# Patient Record
Sex: FEMALE | Race: OTHER | HISPANIC OR LATINO | Employment: FULL TIME | URBAN - METROPOLITAN AREA
[De-identification: names, ages, dates, MRNs, and addresses within clinical notes are randomized per-mention and may not be internally consistent; named-entity substitution may affect disease eponyms.]

---

## 2017-01-05 ENCOUNTER — TELEPHONE (OUTPATIENT)
Dept: MEDICAL GROUP | Facility: MEDICAL CENTER | Age: 32
End: 2017-01-05

## 2017-01-05 ENCOUNTER — OFFICE VISIT (OUTPATIENT)
Dept: MEDICAL GROUP | Facility: MEDICAL CENTER | Age: 32
End: 2017-01-05
Payer: COMMERCIAL

## 2017-01-05 VITALS
SYSTOLIC BLOOD PRESSURE: 106 MMHG | HEART RATE: 101 BPM | BODY MASS INDEX: 28.54 KG/M2 | OXYGEN SATURATION: 100 % | HEIGHT: 64 IN | TEMPERATURE: 99 F | DIASTOLIC BLOOD PRESSURE: 76 MMHG | WEIGHT: 167.2 LBS

## 2017-01-05 DIAGNOSIS — R73.03 PREDIABETES: ICD-10-CM

## 2017-01-05 DIAGNOSIS — E55.9 VITAMIN D DEFICIENCY: ICD-10-CM

## 2017-01-05 DIAGNOSIS — Z30.09 FAMILY PLANNING: ICD-10-CM

## 2017-01-05 DIAGNOSIS — E89.0 HYPOTHYROIDISM, POSTSURGICAL: ICD-10-CM

## 2017-01-05 PROCEDURE — 99214 OFFICE O/P EST MOD 30 MIN: CPT | Performed by: NURSE PRACTITIONER

## 2017-01-05 NOTE — ASSESSMENT & PLAN NOTE
Tolerating ocps well  Denies side effects  Is not interested in having more children for quite some time and is interested in Nexplanon.

## 2017-01-05 NOTE — ASSESSMENT & PLAN NOTE
12/29/16: A1C=5.8  She was taking metformin 500mg daily before she became pregnant. She was not on insulin during her pregnancy. She states she was was not diagnosed with gestation diabetes

## 2017-01-05 NOTE — MR AVS SNAPSHOT
"        Shreya White   2017 10:00 AM   Office Visit   MRN: [id number]    Department:  [address] [place] [address]   Dept Phone:  [phone]    Description:  Female : 1985   Provider:  GENOVEVA Guzman           Reason for Visit     Hypothyroidism review lab results      Allergies as of 2017     No Known Allergies      You were diagnosed with     Vitamin D deficiency   [5792531]   start Vit [id number] daily recheck in 3 months    Prediabetes   [901339]   based on 5.8 hemoglobin a1c will restart 500mg metformin daily. she declines diabetes ed at this time. labs in 3 months    Hypothyroidism, postsurgical   [255125]   keep appt with Dr. Mahoney this month to discuss dosing of levothyroxine    Family planning   [688049]   will discuss with collaboring MD and schedule after we get device.      Vital Signs     Blood Pressure Pulse Temperature Height Weight Body Mass Index    106/76 mmHg 101 37.2 °C (99 °F) 1.626 m (5' 4.02\") 75.841 kg (167 lb 3.2 oz) 28.69 kg/m2    Oxygen Saturation Last Menstrual Period Breastfeeding? Smoking Status          100% 2016 No Never Smoker         Basic Information     Date Of Birth Sex Race Ethnicity Preferred Language    1985 Female Other  Origin (Italian,Indonesian,American,Arnel, etc) English      Your appointments     2017  1:20 PM   Established Patient with Josse Mahoney M.D.   [facility]    [address], [address]  [facility] [address] [phone]           You will be receiving a confirmation call a few days before your appointment from our automated call confirmation system.              Problem List              ICD-10-CM Priority Class Noted - Resolved    Hypothyroidism, postsurgical E89.0   2015 - Present    Prediabetes R73.03   Unknown - Present    Papillary carcinoma of thyroid (HCC) C73   Unknown - Present    Family planning Z30.09   2016 - Present    Vitamin D " deficiency E55.9   1/5/2017 - Present      Health Maintenance        Date Due Completion Dates    PAP SMEAR 7/6/2008 ---    IMM DTaP/Tdap/Td Vaccine (2 - Td) 7/8/2025 7/8/2015            Current Immunizations     Influenza Vaccine Quad Inj (Pf) 11/23/2016    Tdap Vaccine 7/8/2015      Below and/or attached are the medications your provider expects you to take. Review all of your home medications and newly ordered medications with your provider and/or pharmacist. Follow medication instructions as directed by your provider and/or pharmacist. Please keep your medication list with you and share with your provider. Update the information when medications are discontinued, doses are changed, or new medications (including over-the-counter products) are added; and carry medication information at all times in the event of emergency situations     Allergies:  No Known Allergies          Medications  Valid as of: January 05, 2017 - 11:59 AM    Generic Name Brand Name Tablet Size Instructions for use    Levonorgestrel-Ethinyl Estrad (Tab) AVIANE, ALESSE, LESSINA 0.1-20 MG-MCG Take 1 Tab by mouth every day.        Levothyroxine Sodium (Tab) SYNTHROID 137 MCG Take 1 Tab by mouth Every morning on an empty stomach.        MetFORMIN HCl (Tab) GLUCOPHAGE 500 MG Take 1 Tab by mouth every day.        .                 Medicines prescribed today were sent to:     [facility] #[id number] - [place] [address]    Phone: [phone] Fax: [phone]    Open 24 Hours?: No      Medication refill instructions:       If your prescription bottle indicates you have medication refills left, it is not necessary to call your provider’s office. Please contact your pharmacy and they will refill your medication.    If your prescription bottle indicates you do not have any refills left, you may request refills at any time through one of the following ways: The online [employer] system (except Urgent Care), by calling  your provider’s office, or by asking your pharmacy to contact your provider’s office with a refill request. Medication refills are processed only during regular business hours and may not be available until the next business day. Your provider may request additional information or to have a follow-up visit with you prior to refilling your medication.   *Please Note: Medication refills are assigned a new Rx number when refilled electronically. Your pharmacy may indicate that no refills were authorized even though a new prescription for the same medication is available at the pharmacy. Please request the medicine by name with the pharmacy before contacting your provider for a refill.        Your To Do List     Future Labs/Procedures Complete By Expires    HEMOGLOBIN A1C  As directed 1/6/2018    VITAMIN D,25 HYDROXY  As directed 1/6/2018         [employer] Access Code: Activation code not generated  Current [employer] Status: Active

## 2017-01-05 NOTE — TELEPHONE ENCOUNTER
Phone Number Called: [phone] (home)       Message: patient was informed about needing to come to office to sign some consent forms for the Nexplanon    Left Message for patient to call back: no

## 2017-01-05 NOTE — ASSESSMENT & PLAN NOTE
Has upcoming appt this month with Dr. Mahoney  Takes levothyroxine 137mcg daily in am on empty stomach

## 2017-01-05 NOTE — PROGRESS NOTES
Subjective:     Chief Complaint   Patient presents with   • Hypothyroidism     review lab results     Shreya White is a 31 y.o. female here today for evaluation and management of:    Hypothyroidism, postsurgical  Has upcoming appt this month with Dr. Mahoney  Takes levothyroxine 137mcg daily in am on empty stomach      Family planning  Tolerating ocps well  Denies side effects  Is not interested in having more children for quite some time and is interested in Nexplanon.       Vitamin D deficiency  Recent labs show level at 26. Not supplementing    Prediabetes  12/29/16: A1C=5.8  She was taking metformin 500mg daily before she became pregnant. She was not on insulin during her pregnancy. She states she was was not diagnosed with gestation diabetes           Current medicines (including changes today)  Current Outpatient Prescriptions   Medication Sig Dispense Refill   • metformin (GLUCOPHAGE) 500 MG Tab Take 1 Tab by mouth every day. 30 Tab 4   • levonorgestrel-ethinyl estradiol (AVIANE, ALESSE, LESSINA) 0.1-20 MG-MCG per tablet Take 1 Tab by mouth every day. 28 Tab 3   • SYNTHROID 137 MCG Tab Take 1 Tab by mouth Every morning on an empty stomach. 30 Tab 3     No current facility-administered medications for this visit.     She  has a past medical history of Papillary carcinoma of thyroid (HCC) (2012); Postsurgical hypothyroidism (2012); and Prediabetes.    HM: pap record requested. All others health maintenance topics up-to-date.  ROS   Constitutional: Negative for fever, chills/sweats   Respiratory: Negative for shortness of breath, MACDONALD  Cardiovascular: Negative for chest pain or pressure  GI/: Negative for diarrhea/constipation / urinary difficulty  All other systems reviewed and are negative except as per HPI.       Hospital Outpatient Visit on 12/29/2016   Component Date Value   • Cholesterol,Tot 12/29/2016 159    • Triglycerides 12/29/2016 47    • HDL 12/29/2016 58    • LDL 12/29/2016 92    •  "Sodium 12/29/2016 137    • Potassium 12/29/2016 3.6    • Chloride 12/29/2016 102    • Co2 12/29/2016 26    • Anion Gap 12/29/2016 9.0    • Glucose 12/29/2016 99    • Bun 12/29/2016 11    • Creatinine 12/29/2016 0.58    • Calcium 12/29/2016 9.0    • AST(SGOT) 12/29/2016 14    • ALT(SGPT) 12/29/2016 13    • Alkaline Phosphatase 12/29/2016 50    • Total Bilirubin 12/29/2016 0.7    • Albumin 12/29/2016 4.4    • Total Protein 12/29/2016 7.7    • Globulin 12/29/2016 3.3    • A-G Ratio 12/29/2016 1.3    • Glycohemoglobin 12/29/2016 5.8*   • Est Avg Glucose 12/29/2016 120    • 25-Hydroxy   Vitamin D 25 12/29/2016 26*   • GFR If  12/29/2016 >60    • GFR If Non  [employer]mer[employer]* 12/29/2016 >60    Hospital Outpatient Visit on 12/29/2016   Component Date Value   • Free T-4 12/29/2016 1.75*   • TSH 12/29/2016 0.050*   • Thyroglobulin 12/29/2016 <0.1*   • Anti-Thyroglobulin 12/29/2016 <0.9    • Thyroglobulin by LC-MC/M* 12/29/2016 Not Applicable          Objective:     Blood pressure 106/76, pulse 101, temperature 37.2 °C (99 °F), height 1.626 m (5' 4.02\"), weight 75.841 kg (167 lb 3.2 oz), last menstrual period 12/29/2016, SpO2 100 %, not currently breastfeeding. Body mass index is 28.69 kg/(m^2).   Physical Exam:  Alert, oriented in no acute distress.  Eye contact is good, speech goal directed, affect calm  HEENT: conjunctiva non-injected, sclera non-icteric.  Pinna normal. TM pearly gray.   Oral mucous membranes pink and moist with no lesions.  Neck No adenopathy or masses in the neck or supraclavicular regions.  Lungs: clear to auscultation bilaterally with good excursion.  CV: regular rate and rhythm.  Ext: no edema, color normal, vascularity normal, temperature normal  Psych: Alert and oriented x3, normal affect and mood.  Skin: Warm, dry, good turgor, no rashes in visible areas.      Assessment and Plan:   The following treatment plan was discussed   1. Vitamin D deficiency  VITAMIN D,25 HYDROXY    start Vit " [id number] daily recheck in 3 months   2. Prediabetes  HEMOGLOBIN A1C    metformin (GLUCOPHAGE) 500 MG Tab    based on 5.8 hemoglobin a1c will restart 500mg metformin daily. she declines diabetes ed at this time. labs in 3 months   3. Hypothyroidism, postsurgical      keep appt with Dr. Mahoney this month to discuss dosing of levothyroxine   4. Family planning      will discuss with collaboring MD and schedule after we get device.     Patient understands and agrees to plan of care.        Followup: Return in about 3 months (around 4/5/2017). or sooner for new symptoms or should new problems arise      This dictation was created using voice recognition software. The accuracy of the dictation is limited to the abilities of the software. I expect there may be some errors of grammar and possibly content. The MA notes were reviewed and certain aspects of this information were incorporated into this note.

## 2017-01-11 ENCOUNTER — OFFICE VISIT (OUTPATIENT)
Dept: ENDOCRINOLOGY | Facility: MEDICAL CENTER | Age: 32
End: 2017-01-11
Payer: COMMERCIAL

## 2017-01-11 VITALS
BODY MASS INDEX: 28.17 KG/M2 | SYSTOLIC BLOOD PRESSURE: 110 MMHG | DIASTOLIC BLOOD PRESSURE: 72 MMHG | WEIGHT: 165 LBS | HEIGHT: 64 IN | OXYGEN SATURATION: 95 % | HEART RATE: 84 BPM

## 2017-01-11 DIAGNOSIS — C73 PAPILLARY CARCINOMA OF THYROID (HCC): ICD-10-CM

## 2017-01-11 DIAGNOSIS — E89.0 HYPOTHYROIDISM, POSTSURGICAL: Primary | ICD-10-CM

## 2017-01-11 PROCEDURE — 99214 OFFICE O/P EST MOD 30 MIN: CPT | Performed by: INTERNAL MEDICINE

## 2017-01-11 RX ORDER — LEVOTHYROXINE SODIUM 137 UG/1
137 TABLET ORAL
Qty: 90 TAB | Refills: 3 | Status: SHIPPED | OUTPATIENT
Start: 2017-01-11 | End: 2017-01-17 | Stop reason: SDUPTHER

## 2017-01-11 NOTE — MR AVS SNAPSHOT
"        Shreya White   2017 1:20 PM   Office Visit   MRN: [id number]    Department:  [facility]   Dept Phone:  [phone]    Description:  Female : 1985   Provider:  Josse Mahoney M.D.           Reason for Visit     Hypothyroidism thyroid carcinoma      Allergies as of 2017     No Known Allergies      You were diagnosed with     Hypothyroidism, postsurgical   [912580]  -  Primary     Papillary carcinoma of thyroid (HCC)   [531606]         Vital Signs     Blood Pressure Pulse Height Weight Body Mass Index Oxygen Saturation    110/72 mmHg 84 1.626 m (5' 4.02\") 74.844 kg (165 lb) 28.31 kg/m2 95%    Last Menstrual Period Smoking Status                2016 Never Smoker           Basic Information     Date Of Birth Sex Race Ethnicity Preferred Language    1985 Female Other  Origin (Bruneian,Nauruan,Ecuadorean,Arnel, etc) English      Your appointments     2017  8:00 AM   Established Patient with GENOVEVA Guzman   [facility] [address]    [address]  [address]  [facility] [address] [phone]           You will be receiving a confirmation call a few days before your appointment from our automated call confirmation system.            2017  9:00 AM   Established Patient with Josse Mahoney M.D.   [facility]    [address], [address]  [place] [address] [phone]           You will be receiving a confirmation call a few days before your appointment from our automated call confirmation system.              Problem List              ICD-10-CM Priority Class Noted - Resolved    Hypothyroidism, postsurgical E89.0   2015 - Present    Prediabetes R73.03   Unknown - Present    Papillary carcinoma of thyroid (HCC) C73   Unknown - Present    Family planning Z30.09   2016 - Present    Vitamin D deficiency E55.9   2017 - Present      Health Maintenance       "    Date Due Completion Dates    PAP SMEAR 7/6/2008 ---    IMM DTaP/Tdap/Td Vaccine (2 - Td) 7/8/2025 7/8/2015            Current Immunizations     Influenza Vaccine Quad Inj (Pf) 11/23/2016    Tdap Vaccine 7/8/2015      Below and/or attached are the medications your provider expects you to take. Review all of your home medications and newly ordered medications with your provider and/or pharmacist. Follow medication instructions as directed by your provider and/or pharmacist. Please keep your medication list with you and share with your provider. Update the information when medications are discontinued, doses are changed, or new medications (including over-the-counter products) are added; and carry medication information at all times in the event of emergency situations     Allergies:  No Known Allergies          Medications  Valid as of: January 11, 2017 -  1:36 PM    Generic Name Brand Name Tablet Size Instructions for use    Levonorgestrel-Ethinyl Estrad (Tab) AVIANE, ALESSE, LESSINA 0.1-20 MG-MCG Take 1 Tab by mouth every day.        Levothyroxine Sodium (Tab) SYNTHROID 137 MCG Take 1 Tab by mouth Every morning on an empty stomach.        Levothyroxine Sodium (Tab) SYNTHROID 137 MCG Take 1 Tab by mouth Every morning on an empty stomach.        MetFORMIN HCl (Tab) GLUCOPHAGE 500 MG Take 1 Tab by mouth every day.        .                 Medicines prescribed today were sent to:     [facility] #[id number] - [place] [address]    Phone: [phone] Fax: [phone]    Open 24 Hours?: No      Medication refill instructions:       If your prescription bottle indicates you have medication refills left, it is not necessary to call your provider’s office. Please contact your pharmacy and they will refill your medication.    If your prescription bottle indicates you do not have any refills left, you may request refills at any time through one of the following ways: The online [employer]  system (except Urgent Care), by calling your provider’s office, or by asking your pharmacy to contact your provider’s office with a refill request. Medication refills are processed only during regular business hours and may not be available until the next business day. Your provider may request additional information or to have a follow-up visit with you prior to refilling your medication.   *Please Note: Medication refills are assigned a new Rx number when refilled electronically. Your pharmacy may indicate that no refills were authorized even though a new prescription for the same medication is available at the pharmacy. Please request the medicine by name with the pharmacy before contacting your provider for a refill.        Your To Do List     Future Labs/Procedures Complete By Expires    ANTITHYROGLOBULIN AB  As directed 1/11/2018    FREE THYROXINE  As directed 7/14/2017    THYROGLOBULIN, QT  As directed 7/14/2017    TSH  As directed 7/14/2017         MyChart Access Code: Activation code not generated  Current [employer] Status: Active

## 2017-01-12 NOTE — PROGRESS NOTES
Chief Complaint   Patient presents with   • Hypothyroidism     thyroid carcinoma        HPI:        1. Post surgical hypothyroidism.    I am purposely over treating the patient with thyroid in order to suppress her TSH to a certain extent because of her previous metastatic papillary carcinoma relatively recent to 2012.  I would like to keep her TSH low for a while until we are reassured that there is going to be no recurrence.  She is tolerating this dose.  There is a little bit of equivocation about her sleep but otherwise there has been no cardiovascular symptoms.  No tachycardia or cardiac irregularity.  She is not particularly edgy or irritable.  No tremor and certainly no unwanted weight loss.  There may be some stimulation of appetite which can be a problem.  In any event, her free T4 is somewhat elevated at 1.7 (0.5-1.4) although some labs use 1.7 as their upper normal.  Her TSH is .05.  For the moment we are going to leave her dose unchanged.    2. Metastatic papillary carcinoma thyroid, 2012.    Her original resection was a 3.4 cm primary without vascular invasion or capsular invasion but she had four out of seven lymph nodes positive, the largest being 6 mm.  She did have postoperative I-131 157 millicuries.  However she has not had a follow up I-131 scan.  She has had three soft tissue ultrasounds that have not shown any suspicious adenopathy including the last one in August 2016.  Importantly her thyroglobulin level is less than measurable without interfering antibodies.  Also my examination of her neck is unremarkable and she is not aware of any abnormalities in the neck.      We will continue to follow in this fashion.  I don’t plan on doing a thyrogen stimulated I-131 scan unless there are indicators to the contrary.  I will see her again in six months. She is in agreement with the above.     ROS:  All other systems reported as negative or unchanged since last exam        Allergies: No Known  "Allergies    Current medicines including changes today:  Current Outpatient Prescriptions   Medication Sig Dispense Refill   • levothyroxine (SYNTHROID) 137 MCG Tab Take 1 Tab by mouth Every morning on an empty stomach. 90 Tab 3   • metformin (GLUCOPHAGE) 500 MG Tab Take 1 Tab by mouth every day. 30 Tab 4   • levonorgestrel-ethinyl estradiol (AVIANE, ALESSE, LESSINA) 0.1-20 MG-MCG per tablet Take 1 Tab by mouth every day. 28 Tab 3     No current facility-administered medications for this visit.        Past Medical History   Diagnosis Date   • Papillary carcinoma of thyroid (HCC) 2012     L 3.4 cm / 4/7 nodes + [id number]   • Postsurgical hypothyroidism 2012   • Prediabetes      A1c= 6.4       PHYSICAL EXAM:    /72 mmHg  Pulse 84  Ht 1.626 m (5' 4.02\")  Wt 74.844 kg (165 lb)  BMI 28.31 kg/m2  SpO2 95%  LMP 12/29/2016    Gen.   appears healthy, does not appear to be overtly thyrotoxic.    Skin   appropriate for sex and age    HEENT  unremarkable    Neck   No abnormalities in the thyroid area. No nodules or masses elsewhere in the neck or supraclavicular area.    Heart  regular    Extremities  no edema    Neuro  gait and station normal, no tremor    Psych  Appropriate, calm, pleasant    ASSESSMENT AND RECOMMENDATIONS    1. Hypothyroidism, postsurgical               TSH is purposely suppressed. Thyroid is tolerated well  - FREE THYROXINE; Future  - TSH; Future  - levothyroxine (SYNTHROID) 137 MCG Tab; Take 1 Tab by mouth Every morning on an empty stomach.     2. Papillary carcinoma of thyroid (HCC), 2012, metastatic, status post I-131 therapy           No evidence of persistent or residual disease    - THYROGLOBULIN, QT; Future  - ANTITHYROGLOBULIN AB; Future      DISPOSITION: Return in about 6 months (around 7/11/2017).       Josse Mahoney M.D.    Copies to: Nelsy Hicks, KISHORE.P.R.N. [phone]  "

## 2017-01-17 DIAGNOSIS — E89.0 HYPOTHYROIDISM, POSTSURGICAL: ICD-10-CM

## 2017-01-17 RX ORDER — LEVOTHYROXINE SODIUM 137 UG/1
137 TABLET ORAL
Qty: 90 TAB | Refills: 3 | Status: SHIPPED | OUTPATIENT
Start: 2017-01-17 | End: 2018-12-03 | Stop reason: SDUPTHER

## 2017-02-14 DIAGNOSIS — Z30.09 FAMILY PLANNING: ICD-10-CM

## 2017-02-14 RX ORDER — LEVONORGESTREL AND ETHINYL ESTRADIOL 0.1-0.02MG
1 KIT ORAL DAILY
Qty: 28 TAB | Refills: 3 | Status: SHIPPED | OUTPATIENT
Start: 2017-02-14 | End: 2018-01-18

## 2017-03-01 ENCOUNTER — OFFICE VISIT (OUTPATIENT)
Dept: MEDICAL GROUP | Facility: MEDICAL CENTER | Age: 32
End: 2017-03-01
Payer: COMMERCIAL

## 2017-03-01 DIAGNOSIS — Z30.017 INSERTION OF IMPLANTABLE SUBDERMAL CONTRACEPTIVE: ICD-10-CM

## 2017-03-01 DIAGNOSIS — Z30.09 FAMILY PLANNING: ICD-10-CM

## 2017-03-01 PROBLEM — Z97.5 NEXPLANON IN PLACE: Status: ACTIVE | Noted: 2017-03-01

## 2017-03-01 LAB
INT CON NEG: NEGATIVE
INT CON POS: POSITIVE
POC URINE PREGNANCY TEST: NORMAL

## 2017-03-01 PROCEDURE — 81025 URINE PREGNANCY TEST: CPT | Performed by: NURSE PRACTITIONER

## 2017-03-01 PROCEDURE — 11981 INSERTION DRUG DLVR IMPLANT: CPT | Performed by: NURSE PRACTITIONER

## 2017-03-01 NOTE — PROGRESS NOTES
PROCEDURE NOTE:      Nexplanon insertion    Indications for procedure:     Desire for contraception    Written and verbal consent were obtained after discussion of risks and benefits, including but not limited to bleeding, infection, expulsion, bruising.    Procedure:   The proposed procedure was explained to the patient. Risks, side effects, benefits, and alternatives were discussed. All questions were answered to the patient's satisfaction, who gave verbal informed consent. UPT negative.     The skin of the left medial arm was marked at the 8.5 cm proximal to medial epicondyle and an additional marker was made proximally. The area was cleaned with an betadine x 3. Local anesthesia was accomplished with 5 cc of 1% lidocaine with epinephrine.  The Nexplanon was inserted without resistance or complication. The edges and body of the Nexplanon were palpated by myself and the patient in the subdermal tissue.  Area was cleaned with alcohol swab, steri strip was applied.  A pressure dressing was applied.  Patient will keep pressure dressing in place for the next 24 hours. Patient tolerated the procedure well.     The patient was instructed to report any fever, redness, or bleeding. She should keep the area clean and band-aid over the insertion site.     RTC in 3 months to discuss vaginal bleeding, routine surveillance.  Pt advised to use back up contraception for one week.    Lot number: [id number]

## 2017-03-01 NOTE — PATIENT INSTRUCTIONS
Sterile Tape Wound Care  Some cuts and wounds can be closed using sterile tape, also called skin adhesive strips. Skin adhesive strips can be used for shallow (superficial) and simple cuts, wounds, lacerations, and surgical incisions. These strips act in place of stitches to hold the edges of the wound together, allowing for faster healing. Unlike stitches, the adhesive strips do not require needles or anesthetic medicine for placement. The strips will wear off naturally as the wound is healing. It is important to take proper care of your wound at home while it heals.   HOME CARE INSTRUCTIONS  · Try to keep the area around your wound clean and dry. Do not allow the adhesive strips to get wet for the first 12 hours.    · Do not use any soaps or ointments on the wound for the first 12 hours.    · If a bandage (dressing) has been applied, follow your health care provider's instructions for how often to change the dressing. Keep the dressing dry if one has been applied.    · Do not remove the adhesive strips. They will fall off on their own. If they do not, you may remove them gently after 10 days. You should gently wet the strips before removing them. For example, this can be done in the shower.  · Do not scratch, pick, or rub the wound area.    · Protect the wound from further injury until it is healed.    · Protect the wound from sun and tanning bed exposure while it is healing and for several weeks after healing.    · Only take over-the-counter or prescription medicines as directed by your health care provider.    · Keep all follow-up appointments as directed by your health care provider.    SEEK MEDICAL CARE IF:  Your adhesive strips become wet or soaked with blood before the wound has healed. The tape will need to be replaced.   SEEK IMMEDIATE MEDICAL CARE IF:  · You have increasing pain in the wound.    · You develop a rash after the strips are applied.  · Your wound becomes red, swollen, hot, or tender.    · You  have a red streak that goes away from the wound.    · You have pus coming from the wound.    · You have increased bleeding from the wound.  · You notice a bad smell coming from the wound.    · Your wound breaks open.  MAKE SURE YOU:  · Understand these instructions.  · Will watch your condition.  · Will get help right away if you are not doing well or get worse.     This information is not intended to replace advice given to you by your health care provider. Make sure you discuss any questions you have with your health care provider.     Document Released: 01/25/2006 Document Revised: 01/08/2016 Document Reviewed: 07/09/2014  [employer] Interactive Patient Education ©2016 [employer]

## 2017-03-01 NOTE — MR AVS SNAPSHOT
Shreya White   3/1/2017 11:00 AM   Office Visit   MRN: [id number]    Department:  [address] [place] [address]   Dept Phone:  [phone]    Description:  Female : 1985   Provider:  GENOVEVA Guzman           Allergies as of 3/1/2017     No Known Allergies      You were diagnosed with     Insertion of implantable subdermal contraceptive   [V25.5.ICD-9-CM]       Family planning   [471180]         Vital Signs     Smoking Status                   Never Smoker            Basic Information     Date Of Birth Sex Race Ethnicity Preferred Language    1985 Female Other  Origin (Kenyan,Burmese,Finnish,Grenadian, etc) English      Your appointments     2017  8:00 AM   Established Patient with GENOVEVA Guzman   [facility] [address]    [address]  [place] [address] [phone]           You will be receiving a confirmation call a few days before your appointment from our automated call confirmation system.            2017  9:00 AM   Established Patient with Josse Mahoney M.D.   [facility] ([facility])    [address]  [place] [address] [phone]           You will be receiving a confirmation call a few days before your appointment from our automated call confirmation system.              Problem List              ICD-10-CM Priority Class Noted - Resolved    Hypothyroidism, postsurgical E89.0   2015 - Present    Prediabetes R73.03   Unknown - Present    Papillary carcinoma of thyroid (CMS-HCC) C73   Unknown - Present    Family planning Z30.09   2016 - Present    Vitamin D deficiency E55.9   2017 - Present      Health Maintenance        Date Due Completion Dates    PAP SMEAR 2008 ---    IMM DTaP/Tdap/Td Vaccine (2 - Td) 2025            Results     POCT Pregnancy      Component Value Standard Range & Units    POC Urine Pregnancy Test neg Negative    Internal Control Positive Positive     Internal Control Negative Negative                         Current Immunizations     Influenza Vaccine Quad Inj (Pf) 11/23/2016    Tdap Vaccine 7/8/2015      Below and/or attached are the medications your provider expects you to take. Review all of your home medications and newly ordered medications with your provider and/or pharmacist. Follow medication instructions as directed by your provider and/or pharmacist. Please keep your medication list with you and share with your provider. Update the information when medications are discontinued, doses are changed, or new medications (including over-the-counter products) are added; and carry medication information at all times in the event of emergency situations     Allergies:  No Known Allergies          Medications  Valid as of: March 01, 2017 - 11:41 AM    Generic Name Brand Name Tablet Size Instructions for use    Levonorgestrel-Ethinyl Estrad (Tab) AVIANE, ALESSE, LESSINA 0.1-20 MG-MCG Take 1 Tab by mouth every day.        Levothyroxine Sodium (Tab) SYNTHROID 137 MCG Take 1 Tab by mouth Every morning on an empty stomach.        MetFORMIN HCl (Tab) GLUCOPHAGE 500 MG Take 1 Tab by mouth every day.        .                 Medicines prescribed today were sent to:     [facility][id number] - [place] [address]    Phone: [phone] Fax: [phone]    Open 24 Hours?: No    [facility] - [place], [place] - [address] [place] [address]    Phone: [phone] Fax: [phone]    Open 24 Hours?: No      Medication refill instructions:       If your prescription bottle indicates you have medication refills left, it is not necessary to call your provider’s office. Please contact your pharmacy and they will refill your medication.    If your prescription bottle indicates you do not have any refills left, you may request refills at any time  through one of the following ways: The online PDV system (except Urgent Care), by calling your provider’s office, or by asking your pharmacy to contact your provider’s office with a refill request. Medication refills are processed only during regular business hours and may not be available until the next business day. Your provider may request additional information or to have a follow-up visit with you prior to refilling your medication.   *Please Note: Medication refills are assigned a new Rx number when refilled electronically. Your pharmacy may indicate that no refills were authorized even though a new prescription for the same medication is available at the pharmacy. Please request the medicine by name with the pharmacy before contacting your provider for a refill.        Instructions    Sterile Tape Wound Care  Some cuts and wounds can be closed using sterile tape, also called skin adhesive strips. Skin adhesive strips can be used for shallow (superficial) and simple cuts, wounds, lacerations, and surgical incisions. These strips act in place of stitches to hold the edges of the wound together, allowing for faster healing. Unlike stitches, the adhesive strips do not require needles or anesthetic medicine for placement. The strips will wear off naturally as the wound is healing. It is important to take proper care of your wound at home while it heals.   HOME CARE INSTRUCTIONS  · Try to keep the area around your wound clean and dry. Do not allow the adhesive strips to get wet for the first 12 hours.    · Do not use any soaps or ointments on the wound for the first 12 hours.    · If a bandage (dressing) has been applied, follow your health care provider's instructions for how often to change the dressing. Keep the dressing dry if one has been applied.    · Do not remove the adhesive strips. They will fall off on their own. If they do not, you may remove them gently after 10 days. You should gently wet the  strips before removing them. For example, this can be done in the shower.  · Do not scratch, pick, or rub the wound area.    · Protect the wound from further injury until it is healed.    · Protect the wound from sun and tanning bed exposure while it is healing and for several weeks after healing.    · Only take over-the-counter or prescription medicines as directed by your health care provider.    · Keep all follow-up appointments as directed by your health care provider.    SEEK MEDICAL CARE IF:  Your adhesive strips become wet or soaked with blood before the wound has healed. The tape will need to be replaced.   SEEK IMMEDIATE MEDICAL CARE IF:  · You have increasing pain in the wound.    · You develop a rash after the strips are applied.  · Your wound becomes red, swollen, hot, or tender.    · You have a red streak that goes away from the wound.    · You have pus coming from the wound.    · You have increased bleeding from the wound.  · You notice a bad smell coming from the wound.    · Your wound breaks open.  MAKE SURE YOU:  · Understand these instructions.  · Will watch your condition.  · Will get help right away if you are not doing well or get worse.     This information is not intended to replace advice given to you by your health care provider. Make sure you discuss any questions you have with your health care provider.     Document Released: 01/25/2006 Document Revised: 01/08/2016 Document Reviewed: 07/09/2014  [employer] Patient Education ©2016 Elsevier Inc.            [employer] Access Code: Activation code not generated  Current [employer] Status: Active

## 2017-03-02 ENCOUNTER — SUPERVISING PHYSICIAN REVIEW (OUTPATIENT)
Dept: MEDICAL GROUP | Facility: MEDICAL CENTER | Age: 32
End: 2017-03-02

## 2017-03-02 NOTE — PROGRESS NOTES
I have reviewed and agree with history, assessment, and plan for office encounter on 3/1/2017 with REYES Guzman.    Face to face encounter/direct observation: No    Suggested changes to plan or follow-up: none

## 2017-04-05 ENCOUNTER — APPOINTMENT (OUTPATIENT)
Dept: MEDICAL GROUP | Facility: MEDICAL CENTER | Age: 32
End: 2017-04-05
Payer: COMMERCIAL

## 2017-05-02 DIAGNOSIS — R73.03 PREDIABETES: ICD-10-CM

## 2017-05-08 ENCOUNTER — TELEPHONE (OUTPATIENT)
Dept: MEDICAL GROUP | Facility: MEDICAL CENTER | Age: 32
End: 2017-05-08

## 2017-05-08 NOTE — TELEPHONE ENCOUNTER
1. Caller Name: Shreya                                         Call Back Number: [phone]       Patient approves a detailed voicemail message: N\A    2. SPECIFIC Action To Be Taken: Preventative Lab Work    3. Diagnosis/Clinical Reason for Request: Preventative Lab Order    4. Specialty & Provider Name/Lab/Imaging Location: [facility]    5. Is appointment scheduled for requested order/referral: no    Patient's insurance requests preventative labs to be done once a year, she was wondering if these could be ordered for her appt. It July. Patient's insurance did not tell her what kind of labs

## 2017-07-11 ENCOUNTER — OFFICE VISIT (OUTPATIENT)
Dept: MEDICAL GROUP | Facility: MEDICAL CENTER | Age: 32
End: 2017-07-11
Payer: COMMERCIAL

## 2017-07-11 ENCOUNTER — HOSPITAL ENCOUNTER (OUTPATIENT)
Dept: LAB | Facility: MEDICAL CENTER | Age: 32
End: 2017-07-11
Attending: INTERNAL MEDICINE
Payer: COMMERCIAL

## 2017-07-11 VITALS
SYSTOLIC BLOOD PRESSURE: 108 MMHG | RESPIRATION RATE: 16 BRPM | DIASTOLIC BLOOD PRESSURE: 64 MMHG | HEART RATE: 79 BPM | WEIGHT: 162 LBS | OXYGEN SATURATION: 98 % | TEMPERATURE: 98.7 F | HEIGHT: 64 IN | BODY MASS INDEX: 27.66 KG/M2

## 2017-07-11 DIAGNOSIS — C73 PAPILLARY CARCINOMA OF THYROID (HCC): ICD-10-CM

## 2017-07-11 DIAGNOSIS — E89.0 HYPOTHYROIDISM, POSTSURGICAL: ICD-10-CM

## 2017-07-11 DIAGNOSIS — R73.03 PREDIABETES: ICD-10-CM

## 2017-07-11 LAB
HBA1C MFR BLD: 5.6 % (ref ?–5.8)
INT CON NEG: NORMAL
INT CON POS: NORMAL
T4 FREE SERPL-MCNC: 1.16 NG/DL (ref 0.53–1.43)
TSH SERPL DL<=0.005 MIU/L-ACNC: 0.13 UIU/ML (ref 0.3–3.7)

## 2017-07-11 PROCEDURE — 83036 HEMOGLOBIN GLYCOSYLATED A1C: CPT | Performed by: NURSE PRACTITIONER

## 2017-07-11 PROCEDURE — 86800 THYROGLOBULIN ANTIBODY: CPT

## 2017-07-11 PROCEDURE — 99395 PREV VISIT EST AGE 18-39: CPT | Performed by: NURSE PRACTITIONER

## 2017-07-11 PROCEDURE — 84439 ASSAY OF FREE THYROXINE: CPT

## 2017-07-11 PROCEDURE — 36415 COLL VENOUS BLD VENIPUNCTURE: CPT

## 2017-07-11 PROCEDURE — 84443 ASSAY THYROID STIM HORMONE: CPT

## 2017-07-11 PROCEDURE — 84432 ASSAY OF THYROGLOBULIN: CPT

## 2017-07-11 NOTE — PROGRESS NOTES
Chief Complaint:     Shreya White is a 32 y.o. female who presents for annual exam and     Prediabetes  A1C today was 5.6  She has lost some weight  Current taking 500mg metformin daily      Pt has GYN provider: yes  Last colonoscopy: n/a  Bone density test:N\A  Gardiasil:no   Last Td: 7/2015  Regular exercise: yes     She  has a past medical history of Papillary carcinoma of thyroid (CMS-HCC) (2012); Postsurgical hypothyroidism (2012); and Prediabetes. She also has no past medical history of Encounter for long-term (current) use of other medications.  She  has past surgical history that includes thyroidectomy total.  Current Outpatient Prescriptions   Medication Sig Dispense Refill   • levothyroxine (SYNTHROID) 137 MCG Tab Take 1 Tab by mouth Every morning on an empty stomach. 90 Tab 3   • levonorgestrel-ethinyl estradiol (AVIANE, ALESSE, LESSINA) 0.1-20 MG-MCG per tablet Take 1 Tab by mouth every day. 28 Tab 3     No current facility-administered medications for this visit.     Social History   Substance Use Topics   • Smoking status: Never Smoker    • Smokeless tobacco: Never Used   • Alcohol Use: 0.0 oz/week     0 Standard drinks or equivalent per week      Comment: rare       Review Of Systems  Denies fever, chills, or sweats  Skin: negative for rash, scaling, itching, pigmentation, hair or nail changes.  Eyes: negative for visual blurring, double vision, eye pain, floaters and discharge from eyes  Ears/Nose/Throat: negative for tinnitus, vertigo, bleeding gums, dental problem and hoarseness, frequent URI's, sinus trouble, persistent sore throat  Respiratory: negative for persistent cough, hemoptysis, dyspnea, recurrent pneumonia or bronchitis, asthma and wheezing  Cardiovascular: negative for palpitations, tachycardia, irregular heart beat, chest pain, or peripheral edema.  Breast: Denies breast tenderness, mass, discharge, changes in size or contour, or abnormal cyclic  "discomfort.  Gastrointestinal: negative for poor appetite, dysphagia, nausea, heartburn or reflux, abdominal pain, hemorrhoids, constipation or diarrhea  Genitourinary: negative for nocturia, dysuria, frequency, hesitancy, incontinence, sexually transmitted disease, abnormal vaginal discharge, dysparunia or abnormal vaginal bleeding  Musculoskeletal: negative for joint swelling and muscle pain/ soreness  Neurologic: negative for migraine headaches, involuntary movements or tremor  Psychiatric: negative for excessive alcohol consumption or illegal drug usage, sleep disturbance, anxiety, depression, sexual difficulties  Hematologic/Lymphatic/Immunologic: negative for anemia, unusual bruising, swollen glands, HIV risk factors  Endocrine: negative for temperature intolerance, polydipsia, polyuria.       PHYSICAL EXAMINATION:  Blood pressure 108/64, pulse 79, temperature 37.1 °C (98.7 °F), resp. rate 16, height 1.626 m (5' 4\"), weight 73.483 kg (162 lb), SpO2 98 %, not currently breastfeeding.  Body mass index is 27.79 kg/(m^2).  Wt Readings from Last 4 Encounters:   07/11/17 73.483 kg (162 lb)   01/11/17 74.844 kg (165 lb)   01/05/17 75.841 kg (167 lb 3.2 oz)   11/23/16 78.109 kg (172 lb 3.2 oz)       Constitutional: Alert, no distress.  Skin: Warm, dry, good turgor, no rashes in visible areas.  Eye: Equal, round and reactive, conjunctiva clear, lids normal.  ENMT: Lips without lesions, good dentition, oropharynx clear.  Neck: Trachea midline, no masses, no thyromegaly.  Respiratory: Unlabored respiratory effort, lungs clear to auscultation, no wheezes, no ronchi.  Cardiovascular: Normal S1, S2, no murmur, no edema.  Abdomen: Soft, non-tender, no masses, no hepatosplenomegaly.  Psych: Alert and oriented x3, normal affect and mood.    ASSESSMENT/PLAN:  1. Prediabetes  HEMOGLOBIN A1C    POCT Hemoglobin A1C    trial off metformin. A1 in 3 months. heart healthy diet and physical activity.      HCM:  UTD   Labs per " orders  Immunizations per orders  Pt counseled about skin care, diet, supplements, and exercise.  Next office visit for recheck of chronic medical conditions is due in 3 months

## 2017-07-11 NOTE — MR AVS SNAPSHOT
"        Shreya White   2017 4:00 PM   Office Visit   MRN: [id number]    Department:  [address] [place] [address]   Dept Phone:  [phone]    Description:  Female : 1985   Provider:  GENOVEVA Guzman           Reason for Visit     Annual Exam Diabetes       Allergies as of 2017     No Known Allergies      You were diagnosed with     Prediabetes   [066166]         Vital Signs     Blood Pressure Pulse Temperature Respirations Height Weight    108/64 mmHg 79 37.1 °C (98.7 °F) 16 1.626 m (5' 4\") 73.483 kg (162 lb)    Body Mass Index Oxygen Saturation Smoking Status             27.79 kg/m2 98% Never Smoker          Basic Information     Date Of Birth Sex Race Ethnicity Preferred Language    1985 Female Other  Origin (Amharic,South Korean,Palauan,Arnel, etc) English      Your appointments     2017  4:40 PM   Established Patient with Josse Mahoney M.D.   [facility]    [address]  [facility] [address] [phone]           You will be receiving a confirmation call a few days before your appointment from our automated call confirmation system.            Oct 23, 2017  3:40 PM   NEW TO YOU with Shelbie Chapman M.D.   [facility] [address]    [address] [facility]  [address]  [facility] [address] [phone]              Problem List              ICD-10-CM Priority Class Noted - Resolved    Hypothyroidism, postsurgical E89.0   2015 - Present    Prediabetes R73.03   Unknown - Present    Papillary carcinoma of thyroid (CMS-HCC) C73   Unknown - Present    Vitamin D deficiency E55.9   2017 - Present    Nexplanon in place Z97.5   3/1/2017 - Present      Health Maintenance        Date Due Completion Dates    PAP SMEAR 2008 ---    IMM INFLUENZA (1) 2016    IMM DTaP/Tdap/Td Vaccine (2 - Td) 2025            Current Immunizations     Influenza Vaccine Quad Inj (Pf) " 11/23/2016    Tdap Vaccine 7/8/2015      Below and/or attached are the medications your provider expects you to take. Review all of your home medications and newly ordered medications with your provider and/or pharmacist. Follow medication instructions as directed by your provider and/or pharmacist. Please keep your medication list with you and share with your provider. Update the information when medications are discontinued, doses are changed, or new medications (including over-the-counter products) are added; and carry medication information at all times in the event of emergency situations     Allergies:  No Known Allergies          Medications  Valid as of: July 11, 2017 -  4:02 PM    Generic Name Brand Name Tablet Size Instructions for use    Levonorgestrel-Ethinyl Estrad (Tab) AVIANE, ALESSE, LESSINA 0.1-20 MG-MCG Take 1 Tab by mouth every day.        Levothyroxine Sodium (Tab) SYNTHROID 137 MCG Take 1 Tab by mouth Every morning on an empty stomach.        .                 Medicines prescribed today were sent to:     [facility] #[id number] - [place] - [address]    [address] [place] [address]    Phone: [phone] Fax: [phone]    Open 24 Hours?: No    [facility] - [address] [address]    Phone: [phone] Fax: [phone]    Open 24 Hours?: No      Medication refill instructions:       If your prescription bottle indicates you have medication refills left, it is not necessary to call your provider’s office. Please contact your pharmacy and they will refill your medication.    If your prescription bottle indicates you do not have any refills left, you may request refills at any time through one of the following ways: The online M/A-COM system (except Urgent Care), by calling your provider’s office, or by asking your pharmacy to contact your provider’s office with a refill request. Medication refills are processed  only during regular business hours and may not be available until the next business day. Your provider may request additional information or to have a follow-up visit with you prior to refilling your medication.   *Please Note: Medication refills are assigned a new Rx number when refilled electronically. Your pharmacy may indicate that no refills were authorized even though a new prescription for the same medication is available at the pharmacy. Please request the medicine by name with the pharmacy before contacting your provider for a refill.        Your To Do List     Future Labs/Procedures Complete By Expires    HEMOGLOBIN A1C  As directed 7/11/2018         [employer] Access Code: Activation code not generated  Current [employer] Status: Active

## 2017-07-11 NOTE — Clinical Note
[employer]  GENOVEVA Guzman  [address]  [place] [address]  Fax: [phone]   Authorization for Release/Disclosure of   Protected Health Information   Name: SHREYA LORA : 1985 SSN: [id number]   [place] [facility] [address] Phone:    [phone] (home)    I authorize the entity listed below to release/disclose the PHI below to:   [employer]/GENOVEVA Guzman and GENOVEVA Guzman   Provider or Entity Name:  Dr. Benoit   Address   City, [facility]   Phone:      Fax:     Reason for request: continuity of care   Information to be released:    [  ] LAST COLONOSCOPY,  including any PATH REPORT and follow-up  [  ] LAST FIT/COLOGUARD RESULT [  ] LAST DEXA  [  ] LAST MAMMOGRAM  [  ] LAST PAP  [  ] LAST LABS [  ] RETINA EXAM REPORT  [  ] IMMUNIZATION RECORDS  [  ] Release all info      [  ] Check here and initial the line next to each item to release ALL health information INCLUDING  _____ Care and treatment for drug and / or alcohol abuse  _____ HIV testing, infection status, or AIDS  _____ Genetic Testing    DATES OF SERVICE OR TIME PERIOD TO BE DISCLOSED: _____________  I understand and acknowledge that:  * This Authorization may be revoked at any time by you in writing, except if your health information has already been used or disclosed.  * Your health information that will be used or disclosed as a result of you signing this authorization could be re-disclosed by the recipient. If this occurs, your re-disclosed health information may no longer be protected by State or Federal laws.  * You may refuse to sign this Authorization. Your refusal will not affect your ability to obtain treatment.  * This Authorization becomes effective upon signing and will  on (date) __________.      If no date is indicated, this Authorization will  one (1) year from the signature date.    Name: Shreya Lora    Signature:   Date:     2017       PLEASE FAX REQUESTED RECORDS BACK TO: [phone]

## 2017-07-12 DIAGNOSIS — C73 PAPILLARY CARCINOMA OF THYROID (HCC): Primary | ICD-10-CM

## 2017-07-13 LAB
THYROGLOB AB SERPL-ACNC: <0.9 IU/ML (ref 0–4)
THYROGLOB SERPL-MCNC: <0.1 NG/ML (ref 1.3–31.8)
THYROGLOB SERPL-MCNC: ABNORMAL NG/ML (ref 1.3–31.8)

## 2017-07-18 ENCOUNTER — OFFICE VISIT (OUTPATIENT)
Dept: ENDOCRINOLOGY | Facility: MEDICAL CENTER | Age: 32
End: 2017-07-18
Payer: COMMERCIAL

## 2017-07-18 VITALS
HEIGHT: 64 IN | DIASTOLIC BLOOD PRESSURE: 84 MMHG | SYSTOLIC BLOOD PRESSURE: 112 MMHG | BODY MASS INDEX: 27.79 KG/M2 | HEART RATE: 81 BPM | OXYGEN SATURATION: 100 % | WEIGHT: 162.8 LBS

## 2017-07-18 DIAGNOSIS — E89.0 HYPOTHYROIDISM, POSTSURGICAL: Primary | ICD-10-CM

## 2017-07-18 DIAGNOSIS — C73 PAPILLARY CARCINOMA OF THYROID (HCC): ICD-10-CM

## 2017-07-18 PROCEDURE — 99213 OFFICE O/P EST LOW 20 MIN: CPT | Performed by: INTERNAL MEDICINE

## 2017-07-18 NOTE — MR AVS SNAPSHOT
"        Shreya White   2017 4:40 PM   Office Visit   MRN: [id number]    Department:  Endocrinology [facility]   Dept Phone:  [phone]    Description:  Female : 1985   Provider:  Josse Mahoney M.D.           Reason for Visit     Thyroid Carcinoma     Hypothyroidism           Allergies as of 2017     No Known Allergies      You were diagnosed with     Hypothyroidism, postsurgical   [249467]  -  Primary     Papillary carcinoma of thyroid (CMS-HCC)   [150186]         Vital Signs     Blood Pressure Pulse Height Weight Body Mass Index Oxygen Saturation    112/84 mmHg 81 1.626 m (5' 4\") 73.846 kg (162 lb 12.8 oz) 27.93 kg/m2 100%    Smoking Status                   Never Smoker            Basic Information     Date Of Birth Sex Race Ethnicity Preferred Language    1985 Female Other  Origin ([place]er,[place]ian,[place] Islander,H[place]ian, etc) English      Your appointments     2017  3:30 PM   US [id number] with S SHARMILAAN US 2   [facility]    [facility]  [address]  [address]  [place] [id number] [phone]            Oct 23, 2017  3:40 PM   NEW TO YOU with Shelbie Chapman M.D.   [facility] [address]    [address]  [address]  [place] [id number] [phone]            2018  4:20 PM   Established Patient with Josse Mahoney M.D.   [facility] ([facility]    [address]  [place] [id number] [phone]           You will be receiving a confirmation call a few days before your appointment from our automated call confirmation system.              Problem List              ICD-10-CM Priority Class Noted - Resolved    Hypothyroidism, postsurgical E89.0   2015 - Present    Prediabetes R73.03   Unknown - Present    Papillary carcinoma of thyroid (CMS-HCC) C73   Unknown - Present    Vitamin D deficiency E55.9   2017 - Present    Nexplanon in " place Z97.5   3/1/2017 - Present      Health Maintenance        Date Due Completion Dates    PAP SMEAR 7/6/2008 ---    IMM INFLUENZA (1) 9/1/2017 11/23/2016    IMM DTaP/Tdap/Td Vaccine (2 - Td) 7/8/2025 7/8/2015            Current Immunizations     Influenza Vaccine Quad Inj (Pf) 11/23/2016    Tdap Vaccine 7/8/2015      Below and/or attached are the medications your provider expects you to take. Review all of your home medications and newly ordered medications with your provider and/or pharmacist. Follow medication instructions as directed by your provider and/or pharmacist. Please keep your medication list with you and share with your provider. Update the information when medications are discontinued, doses are changed, or new medications (including over-the-counter products) are added; and carry medication information at all times in the event of emergency situations     Allergies:  No Known Allergies          Medications  Valid as of: July 18, 2017 -  5:17 PM    Generic Name Brand Name Tablet Size Instructions for use    Levonorgestrel-Ethinyl Estrad (Tab) LILA MIXONE LESSINA 0.1-20 MG-MCG Take 1 Tab by mouth every day.        Levothyroxine Sodium (Tab) SYNTHROID 137 MCG Take 1 Tab by mouth Every morning on an empty stomach.        .                 Medicines prescribed today were sent to:     [facility][id number] - [place] - [address]    [address] [place] [address]    Phone: [phone] Fax: [phone]    Open 24 Hours?: No    [facility] - [address] [address]    Phone: [phone] Fax: [phone]    Open 24 Hours?: No      Medication refill instructions:       If your prescription bottle indicates you have medication refills left, it is not necessary to call your provider’s office. Please contact your pharmacy and they will refill your medication.    If your prescription bottle indicates you do not have any refills  left, you may request refills at any time through one of the following ways: The online TxVia system (except Urgent Care), by calling your provider’s office, or by asking your pharmacy to contact your provider’s office with a refill request. Medication refills are processed only during regular business hours and may not be available until the next business day. Your provider may request additional information or to have a follow-up visit with you prior to refilling your medication.   *Please Note: Medication refills are assigned a new Rx number when refilled electronically. Your pharmacy may indicate that no refills were authorized even though a new prescription for the same medication is available at the pharmacy. Please request the medicine by name with the pharmacy before contacting your provider for a refill.        Your To Do List     Future Labs/Procedures Complete By Expires    ANTITHYROGLOBULIN AB  As directed 7/18/2018    FREE THYROXINE  As directed 1/18/2018    THYROGLOBULIN, QT  As directed 1/18/2018    TSH  As directed 1/18/2018         [employer] Access Code: Activation code not generated  Current TxVia Status: Active

## 2017-07-19 ENCOUNTER — HOSPITAL ENCOUNTER (OUTPATIENT)
Dept: RADIOLOGY | Facility: MEDICAL CENTER | Age: 32
End: 2017-07-19
Attending: INTERNAL MEDICINE
Payer: COMMERCIAL

## 2017-07-19 DIAGNOSIS — C73 PAPILLARY CARCINOMA OF THYROID (HCC): ICD-10-CM

## 2017-07-19 PROCEDURE — 76536 US EXAM OF HEAD AND NECK: CPT

## 2017-07-19 NOTE — PROGRESS NOTES
"Chief Complaint   Patient presents with   • Thyroid Carcinoma   • Hypothyroidism        HPI:    1.  Metastatic papillary carcinoma thyroid, 2012              No new developments in the neck or thyroid area. Not aware of any new nodules or masses. No voice change, difficulty swallowing, or breathing              Current thyroglobulin is unmeasurable without interfering antibodies.              Soft tissue neck ultrasound is pending tomorrow. Review results by my chart    2.  Postsurgical hypothyroidism                Patient is euthyroid. TSH is purposely suppressed at 0.13 with free thyroxine mid range at 1.1.                She is taking L-thyroxine 137 µg and tolerating it well. Previously she thought it disturbed her sleep but no longer having that difficulty.    ROS:  All other systems reported as negative or unchanged since last exam      Allergies: No Known Allergies    Current medicines including changes today:  Current Outpatient Prescriptions   Medication Sig Dispense Refill   • levothyroxine (SYNTHROID) 137 MCG Tab Take 1 Tab by mouth Every morning on an empty stomach. 90 Tab 3   • levonorgestrel-ethinyl estradiol (AVIANE, ALESSE, LESSINA) 0.1-20 MG-MCG per tablet Take 1 Tab by mouth every day. 28 Tab 3     No current facility-administered medications for this visit.        Past Medical History   Diagnosis Date   • Papillary carcinoma of thyroid (CMS-HCC) 2012     L 3.4 cm / 4/7 nodes + [id number]   • Postsurgical hypothyroidism 2012   • Prediabetes      A1c= 6.4       PHYSICAL EXAM:    /84 mmHg  Pulse 81  Ht 1.626 m (5' 4\")  Wt 73.846 kg (162 lb 12.8 oz)  BMI 27.93 kg/m2  SpO2 100%    Gen.   appears healthy     Skin   appropriate for sex and age    HEENT  unremarkable    Neck   No abnormalities in the thyroid area. No nodules or masses elsewhere in the neck or supraclavicular area.    Heart  regular    Extremities  no edema    Neuro  gait and station normal, no tremor    Psych  appropriate, calm, " pleasant      ASSESSMENT AND RECOMMENDATIONS    1. Papillary carcinoma of thyroid (CMS-HCC), 2012, metastatic             Post I-131 therapy without evidence of recurrence.             Current ultrasound pending tomorrow. Follow-up results by my chart  - ANTITHYROGLOBULIN AB; Future  - THYROGLOBULIN, QT; Future    2. Hypothyroidism, postsurgical          Clinically euthyroid with purposely suppressed TSH taking levothyroxine 137 µg daily  - FREE THYROXINE; Future  - TSH; Future      DISPOSITION: Return in about 6 months (around 1/18/2018).       Josse Mahoney M.D.    Copies to: GENOVEVA Guzman [phone]

## 2018-01-16 ENCOUNTER — APPOINTMENT (OUTPATIENT)
Dept: ENDOCRINOLOGY | Facility: MEDICAL CENTER | Age: 33
End: 2018-01-16
Payer: COMMERCIAL

## 2018-01-16 ENCOUNTER — HOSPITAL ENCOUNTER (OUTPATIENT)
Dept: LAB | Facility: MEDICAL CENTER | Age: 33
End: 2018-01-16
Attending: INTERNAL MEDICINE
Payer: COMMERCIAL

## 2018-01-16 DIAGNOSIS — C73 PAPILLARY CARCINOMA OF THYROID (HCC): ICD-10-CM

## 2018-01-16 DIAGNOSIS — E89.0 HYPOTHYROIDISM, POSTSURGICAL: ICD-10-CM

## 2018-01-16 LAB
T4 FREE SERPL-MCNC: 1.26 NG/DL (ref 0.53–1.43)
TSH SERPL DL<=0.005 MIU/L-ACNC: 0.06 UIU/ML (ref 0.38–5.33)

## 2018-01-16 PROCEDURE — 36415 COLL VENOUS BLD VENIPUNCTURE: CPT

## 2018-01-16 PROCEDURE — 84443 ASSAY THYROID STIM HORMONE: CPT

## 2018-01-16 PROCEDURE — 84439 ASSAY OF FREE THYROXINE: CPT

## 2018-01-16 PROCEDURE — 86800 THYROGLOBULIN ANTIBODY: CPT

## 2018-01-16 PROCEDURE — 84432 ASSAY OF THYROGLOBULIN: CPT

## 2018-01-18 ENCOUNTER — OFFICE VISIT (OUTPATIENT)
Dept: MEDICAL GROUP | Facility: MEDICAL CENTER | Age: 33
End: 2018-01-18
Payer: COMMERCIAL

## 2018-01-18 VITALS
HEART RATE: 78 BPM | DIASTOLIC BLOOD PRESSURE: 78 MMHG | OXYGEN SATURATION: 96 % | BODY MASS INDEX: 30.05 KG/M2 | HEIGHT: 64 IN | TEMPERATURE: 98.6 F | WEIGHT: 176 LBS | SYSTOLIC BLOOD PRESSURE: 124 MMHG

## 2018-01-18 DIAGNOSIS — E89.0 HYPOTHYROIDISM, POSTSURGICAL: ICD-10-CM

## 2018-01-18 DIAGNOSIS — E66.9 OBESITY (BMI 30-39.9): ICD-10-CM

## 2018-01-18 DIAGNOSIS — R73.03 PREDIABETES: ICD-10-CM

## 2018-01-18 DIAGNOSIS — E55.9 VITAMIN D DEFICIENCY: ICD-10-CM

## 2018-01-18 LAB
THYROGLOB AB SERPL-ACNC: <0.9 IU/ML (ref 0–4)
THYROGLOB SERPL-MCNC: 0.1 NG/ML (ref 1.3–31.8)
THYROGLOB SERPL-MCNC: ABNORMAL NG/ML (ref 1.3–31.8)

## 2018-01-18 PROCEDURE — 99214 OFFICE O/P EST MOD 30 MIN: CPT | Performed by: FAMILY MEDICINE

## 2018-01-18 ASSESSMENT — PATIENT HEALTH QUESTIONNAIRE - PHQ9: CLINICAL INTERPRETATION OF PHQ2 SCORE: 0

## 2018-01-18 NOTE — PROGRESS NOTES
cc: Prediabetes    Subjective:     Shreya White is a 32 y.o. female presenting to establish care and to discuss the followin. Prediabetes: Has a history of elevated blood sugars, but not in the diabetes range. She has gained about 10 pounds over the past several months, has been eating more and not exercising regularly. Her last A1c 6 months ago had improved to 5.6. She plans to improve her diet now that the holidays are over. Denies any polyuria, polydipsia, appetite loss, weight loss.    2. Hypothyroidism: Has a history of thyroid cancer (metastatic papillary, ), status post iodine therapy and surgical resection. Follows closely with endocrinology. Is currently on levothyroxine 137 µg daily. Denies any heat/cold intolerance, diarrhea/constipation, abdominal pain, tremors, skin changes, palpitations. Has a follow-up with her endocrinologist later this month.      Review of systems:  See above.       Current Outpatient Prescriptions:   •  Etonogestrel (NEXPLANON SC), Inject  as instructed., Disp: , Rfl:   •  levothyroxine (SYNTHROID) 137 MCG Tab, Take 1 Tab by mouth Every morning on an empty stomach., Disp: 90 Tab, Rfl: 3    No Known Allergies    Past Medical History:   Diagnosis Date   • Papillary carcinoma of thyroid (CMS-HCC)     L 3.4 cm / 4/7 nodes + [id number]   • Papillary carcinoma of thyroid (CMS-HCC)     Rx 131 157 mCi   • Postsurgical hypothyroidism    • Prediabetes     A1c = 6.4     Past Surgical History:   Procedure Laterality Date   • THYROIDECTOMY TOTAL      3/24/2012     History reviewed. No pertinent family history.  Social History     Social History   • Marital status:      Spouse name: N/A   • Number of children: N/A   • Years of education: N/A     Occupational History   • Not on file.     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  PERRL, EOMI, no icterus or pallor.  Conjunctivae and lids normal. External ears normal.  Neck supple.  No masses palpated. No cervical or supraclavicular lymphadenopathy.  Heart: Regular rate and rhythm.  S1 and S2 normal.  No murmurs appreciated.  Respiratory: Normal respiratory effort.  Clear to auscultation bilaterally.  Abdomen: Non-distended, soft  Skin: Warm, dry, no rashes.  Musculoskeletal: Gait is normal.  Moves all extremities well.  Extremities: No leg edema.    Psych:  Affect/mood is normal, judgement is good, memory is intact, grooming is appropriate.    Assessment/Plan:     Shreya was seen today for hypothyroidism.    Diagnoses and all orders for this visit:    Prediabetes  Discussed healthy diet and exercise. Will recheck lab during her annual physical.    Hypothyroidism, postsurgical  Stable, followed by endocrinology.    Vitamin D deficiency  Recommended supplementation given her mildly diminished levels.    Obesity (BMI 30-39.9)  -     Patient identified as having weight management issue.  Appropriate orders and counseling given.         Return in about 1 year (around 1/18/2019) for Preventive/annual physical (pap).  "

## 2018-01-22 ENCOUNTER — OFFICE VISIT (OUTPATIENT)
Dept: ENDOCRINOLOGY | Facility: MEDICAL CENTER | Age: 33
End: 2018-01-22
Payer: COMMERCIAL

## 2018-01-22 VITALS
DIASTOLIC BLOOD PRESSURE: 74 MMHG | SYSTOLIC BLOOD PRESSURE: 108 MMHG | OXYGEN SATURATION: 96 % | HEART RATE: 91 BPM | HEIGHT: 64 IN | BODY MASS INDEX: 30.22 KG/M2 | WEIGHT: 177 LBS

## 2018-01-22 DIAGNOSIS — E89.0 HYPOTHYROIDISM, POSTSURGICAL: Primary | ICD-10-CM

## 2018-01-22 DIAGNOSIS — C73 PAPILLARY CARCINOMA OF THYROID (HCC): ICD-10-CM

## 2018-01-22 PROCEDURE — 99213 OFFICE O/P EST LOW 20 MIN: CPT | Performed by: INTERNAL MEDICINE

## 2018-01-22 NOTE — PROGRESS NOTES
"Chief Complaint   Patient presents with   • Thyroid Carcinoma   • Hypothyroidism     post surgical        HPI:     1.  Metastatic papillary carcinoma thyroid, 2012             No new developments in the neck or thyroid area. Not aware of any new nodules or masses. No voice change, difficulty swallowing, or breathing             Postsurgical I-131 therapy 157 mCi. No post therapy I-131 scan but ultrasounds have been unremarkable since in addition to low Thyroglobulins.             Soft-tissue ultrasound July 2017 showed no evidence of any residual carcinoma.             Current thyroglobulin is 0.1 without interfering antibodies    2.  Postsurgical hypothyroidism            Clinically euthyroid. She is purposely overtreated to suppress her TSH. She has no symptoms of excessive thyroid replacement.            Current TSH is 0.06 and free T4 upper normal at 1.2 (0.5-1.4).            We will continue on current thyroid replacement which is levothyroxine 137 µg.  ROS:        Allergies: No Known Allergies    Current medicines including changes today:  Current Outpatient Prescriptions   Medication Sig Dispense Refill   • Etonogestrel (NEXPLANON SC) Inject  as instructed.     • levothyroxine (SYNTHROID) 137 MCG Tab Take 1 Tab by mouth Every morning on an empty stomach. 90 Tab 3     No current facility-administered medications for this visit.         Past Medical History:   Diagnosis Date   • Papillary carcinoma of thyroid (CMS-HCC) 2012    L 3.4 cm / 4/7 nodes + [id number]   • Papillary carcinoma of thyroid (CMS-HCC) 2012    Rx 131 157 mCi   • Postsurgical hypothyroidism 2012   • Prediabetes     A1c = 6.4       PHYSICAL EXAM:    /74   Pulse 91   Ht 1.626 m (5' 4\")   Wt 80.3 kg (177 lb)   SpO2 96%   BMI 30.38 kg/m²      Gen.   appears healthy     Skin   appropriate for sex and age    HEENT  unremarkable    Neck   No abnormalities in the thyroid area. No nodules or masses elsewhere in the neck or supraclavicular " area.    Heart  regular    Extremities  no edema    Neuro  gait and station normal    Psych  appropriate    ASSESSMENT AND RECOMMENDATIONS    1. Papillary carcinoma of thyroid (CMS-HCC), metastatic, 2012              Post I-131 therapy without evidence of residual or recurrence   - ANTITHYROGLOBULIN AB; Future  - THYROGLOBULIN, QT; Future    2. Hypothyroidism, postsurgical           Tolerating thyroid well. Continue levothyroxine 137 µg per day  - FREE THYROXINE; Future  - TSH; Future      DISPOSITION: Return in 6 months and review       Josse Mahoney M.D.    Copies to: Shelbie Chapman M.D. [phone]

## 2018-02-27 DIAGNOSIS — E89.0 HYPOTHYROIDISM, POSTSURGICAL: ICD-10-CM

## 2018-02-27 RX ORDER — LEVOTHYROXINE SODIUM 137 UG/1
TABLET ORAL
Qty: 90 TAB | Refills: 2 | Status: SHIPPED | OUTPATIENT
Start: 2018-02-27 | End: 2019-01-11

## 2018-07-30 ENCOUNTER — APPOINTMENT (OUTPATIENT)
Dept: ENDOCRINOLOGY | Facility: MEDICAL CENTER | Age: 33
End: 2018-07-30
Payer: COMMERCIAL

## 2018-08-01 ENCOUNTER — HOSPITAL ENCOUNTER (OUTPATIENT)
Dept: LAB | Facility: MEDICAL CENTER | Age: 33
End: 2018-08-01
Attending: INTERNAL MEDICINE
Payer: COMMERCIAL

## 2018-08-01 DIAGNOSIS — E89.0 HYPOTHYROIDISM, POSTSURGICAL: ICD-10-CM

## 2018-08-01 DIAGNOSIS — C73 PAPILLARY CARCINOMA OF THYROID (HCC): ICD-10-CM

## 2018-08-01 DIAGNOSIS — C73 PAPILLARY CARCINOMA OF THYROID (HCC): Primary | ICD-10-CM

## 2018-08-02 ENCOUNTER — HOSPITAL ENCOUNTER (OUTPATIENT)
Dept: LAB | Facility: MEDICAL CENTER | Age: 33
End: 2018-08-02
Attending: INTERNAL MEDICINE
Payer: COMMERCIAL

## 2018-08-02 DIAGNOSIS — C73 PAPILLARY CARCINOMA OF THYROID (HCC): ICD-10-CM

## 2018-08-02 DIAGNOSIS — E89.0 HYPOTHYROIDISM, POSTSURGICAL: ICD-10-CM

## 2018-08-02 LAB
T4 FREE SERPL-MCNC: 1.34 NG/DL (ref 0.53–1.43)
TSH SERPL DL<=0.005 MIU/L-ACNC: 0.02 UIU/ML (ref 0.38–5.33)

## 2018-08-02 PROCEDURE — 84432 ASSAY OF THYROGLOBULIN: CPT

## 2018-08-02 PROCEDURE — 84439 ASSAY OF FREE THYROXINE: CPT

## 2018-08-02 PROCEDURE — 86800 THYROGLOBULIN ANTIBODY: CPT

## 2018-08-02 PROCEDURE — 84443 ASSAY THYROID STIM HORMONE: CPT

## 2018-08-02 PROCEDURE — 36415 COLL VENOUS BLD VENIPUNCTURE: CPT

## 2018-08-06 ENCOUNTER — TELEPHONE (OUTPATIENT)
Dept: ENDOCRINOLOGY | Facility: MEDICAL CENTER | Age: 33
End: 2018-08-06

## 2018-08-06 NOTE — TELEPHONE ENCOUNTER
1. Caller Name: Shreya White                 Call Back Number: [phone] (home)         Patient approves a detailed voicemail message: yes    Patient called and lm asking if Dr Mahoney placed in her lab orders since they were . I called her back and lm letting her know they are in her chart and that he will review her lab results with her at her appt on 18.

## 2018-08-07 ENCOUNTER — OFFICE VISIT (OUTPATIENT)
Dept: ENDOCRINOLOGY | Facility: MEDICAL CENTER | Age: 33
End: 2018-08-07
Payer: COMMERCIAL

## 2018-08-07 VITALS
DIASTOLIC BLOOD PRESSURE: 70 MMHG | BODY MASS INDEX: 29.53 KG/M2 | HEIGHT: 64 IN | WEIGHT: 173 LBS | OXYGEN SATURATION: 95 % | HEART RATE: 78 BPM | SYSTOLIC BLOOD PRESSURE: 110 MMHG | RESPIRATION RATE: 16 BRPM

## 2018-08-07 DIAGNOSIS — E89.0 HYPOTHYROIDISM, POSTSURGICAL: ICD-10-CM

## 2018-08-07 DIAGNOSIS — C73 PAPILLARY CARCINOMA OF THYROID (HCC): ICD-10-CM

## 2018-08-07 PROCEDURE — 99213 OFFICE O/P EST LOW 20 MIN: CPT | Performed by: INTERNAL MEDICINE

## 2018-08-08 NOTE — PROGRESS NOTES
"Chief Complaint   Patient presents with   • Thyroid Carcinoma     2012 metastatic papillary   • Hypothyroidism     postsurgical        HPI:     Metastatic papillary carcinoma thyroid, 2012          Carcinoma history is detailed in the progress note of January 22, 2018  See assessment and recommendations below    ROS:   All other systems reported as negative or unchanged since last exam      Allergies: No Known Allergies    Current medicines including changes today:  Current Outpatient Prescriptions   Medication Sig Dispense Refill   • Etonogestrel (NEXPLANON SC) Inject  as instructed.     • levothyroxine (SYNTHROID) 137 MCG Tab Take 1 Tab by mouth Every morning on an empty stomach. 90 Tab 3   • levothyroxine (SYNTHROID) 137 MCG Tab TAKE ONE TABLET BY MOUTH EVERY MORNING ON AN EMPTY STOMACH 90 Tab 2     No current facility-administered medications for this visit.         Past Medical History:   Diagnosis Date   • Papillary carcinoma of thyroid (HCC) 2012    L 3.4 cm / 4/7 nodes + [id number]   • Papillary carcinoma of thyroid (HCC) 2012    Rx 131 157 mCi   • Postsurgical hypothyroidism 2012   • Prediabetes     A1c = 6.4       PHYSICAL EXAM:    /70   Pulse 78   Resp 16   Ht 1.626 m (5' 4\")   Wt 78.5 kg (173 lb)   SpO2 95%   BMI 29.70 kg/m²       Gen.   appears healthy in no distress    Skin   appropriate for sex and age    HEENT  unremarkable    Neck   No abnormalities in the thyroid area. No nodules or masses elsewhere in the neck or supraclavicular area    Heart  regular    Extremities  no edema    Neuro  gait and station normal    Psych  appropriate    ASSESSMENT AND RECOMMENDATIONS    1. Papillary carcinoma of thyroid (HCC), 2012            No evidence of recurrence.             Ultrasound July 2017 was clean. Current thyroglobulin is<0.1 without interfering antibodies                 2. Hypothyroidism, postsurgical               TSH is purposely suppressed which she is tolerating well taking " levothyroxine 137 µg daily                Current TSH is 0.02 and free thyroxine is upper normal at 1.3        DISPOSITION: No changes indicated.                           Return and review in 6 months       Josse Mahoney M.D.    Copies to: Shelbie Chapman M.D. [phone]

## 2018-12-03 DIAGNOSIS — E89.0 HYPOTHYROIDISM, POSTSURGICAL: ICD-10-CM

## 2018-12-03 RX ORDER — LEVOTHYROXINE SODIUM 137 UG/1
137 TABLET ORAL
Qty: 90 TAB | Refills: 3 | Status: SHIPPED | OUTPATIENT
Start: 2018-12-03 | End: 2019-12-23 | Stop reason: SDUPTHER

## 2019-01-11 ENCOUNTER — OFFICE VISIT (OUTPATIENT)
Dept: MEDICAL GROUP | Facility: MEDICAL CENTER | Age: 34
End: 2019-01-11
Payer: COMMERCIAL

## 2019-01-11 ENCOUNTER — HOSPITAL ENCOUNTER (OUTPATIENT)
Dept: RADIOLOGY | Facility: MEDICAL CENTER | Age: 34
End: 2019-01-11
Attending: FAMILY MEDICINE
Payer: COMMERCIAL

## 2019-01-11 VITALS
TEMPERATURE: 98.6 F | BODY MASS INDEX: 29.19 KG/M2 | HEIGHT: 64 IN | RESPIRATION RATE: 16 BRPM | DIASTOLIC BLOOD PRESSURE: 70 MMHG | SYSTOLIC BLOOD PRESSURE: 118 MMHG | OXYGEN SATURATION: 98 % | HEART RATE: 76 BPM | WEIGHT: 171 LBS

## 2019-01-11 DIAGNOSIS — M25.561 ACUTE PAIN OF RIGHT KNEE: ICD-10-CM

## 2019-01-11 PROCEDURE — 99213 OFFICE O/P EST LOW 20 MIN: CPT | Performed by: FAMILY MEDICINE

## 2019-01-11 PROCEDURE — 73562 X-RAY EXAM OF KNEE 3: CPT | Mod: RT

## 2019-01-11 ASSESSMENT — PATIENT HEALTH QUESTIONNAIRE - PHQ9: CLINICAL INTERPRETATION OF PHQ2 SCORE: 0

## 2019-01-11 NOTE — PROGRESS NOTES
"cc: Knee pain    Subjective:     Shreya White is a 33 y.o. female presenting to discuss right knee pain.  In the past, she has had intermittent mild knee pain towards the end of the day that usually resolves by the morning.  However, 3 nights ago, she developed a similar pain that persisted.  Describes a dull, achy, constant pain in the back of her right knee that did not radiate.  Was associated with some limping due to pain, right knee swelling and some feeling of instability.  Otherwise denied any numbness, tingling, redness, fevers, weight loss, appetite changes, night sweats, weakness, injuries, chest pain, shortness of breath, palpitations.  No recent increase in activities.  No long car rides or plane rides.  She has tried using a compression sleeve and sitting more at work.  Her symptoms have gradually improved, today it is almost resolved.    Review of systems:  See above.       Current Outpatient Prescriptions:   •  levothyroxine (SYNTHROID) 137 MCG Tab, Take 1 Tab by mouth Every morning on an empty stomach., Disp: 90 Tab, Rfl: 3  •  Etonogestrel (NEXPLANON SC), Inject  as instructed., Disp: , Rfl:     Allergies, past medical history, past surgical history, family history, social history reviewed and updated    Objective:     Vitals: /70 (BP Location: Right arm, Patient Position: Sitting)   Pulse 76   Temp 37 °C (98.6 °F)   Resp 16   Ht 1.626 m (5' 4\")   Wt 77.6 kg (171 lb)   SpO2 98%   BMI 29.35 kg/m²   General: Alert, pleasant, NAD  HEENT: Normocephalic.   Skin: Warm, dry, no rashes.  Musculoskeletal: Gait is normal.  Moves all extremities well. No edema, ecchymosis, or deformities in the lower extremities.  No tenderness to palpation in the knee or leg. Range of motion of the knee and patella are normal.  Strength intact.  Tone normal, no muscle atrophy.  Sensation grossly intact.  Patellar reflexes 2+ symmetric.  Negative anterior/posterior drawer, Melissa's.  Popliteal pulses " 1+ symmetric  Psych:  Affect/mood is normal, judgement is good, memory is intact, grooming is appropriate.    Assessment/Plan:     Shreya was seen today for knee pain.    Diagnoses and all orders for this visit:    Acute pain of right knee  As her symptoms are improving, gave her reassurance that this might have been a transient inflammation of the meniscus or some other component of the knee.  Recommended Tylenol, ibuprofen, heat or ice.  She can continue with the compression sleeve if needed.  We will also check an x-ray to rule out any bony abnormalities.  Potential for a Baker's cyst or clot, but suspicion is low. Discussed signs/symptoms to watch for.    -     DX-KNEE 3 VIEWS RIGHT; Future        Return if symptoms worsen or fail to improve.

## 2019-01-18 ENCOUNTER — HOSPITAL ENCOUNTER (OUTPATIENT)
Facility: MEDICAL CENTER | Age: 34
End: 2019-01-18
Attending: FAMILY MEDICINE
Payer: COMMERCIAL

## 2019-01-18 ENCOUNTER — OFFICE VISIT (OUTPATIENT)
Dept: MEDICAL GROUP | Facility: MEDICAL CENTER | Age: 34
End: 2019-01-18
Payer: COMMERCIAL

## 2019-01-18 VITALS
SYSTOLIC BLOOD PRESSURE: 120 MMHG | OXYGEN SATURATION: 98 % | BODY MASS INDEX: 29.19 KG/M2 | TEMPERATURE: 98.5 F | HEIGHT: 64 IN | DIASTOLIC BLOOD PRESSURE: 80 MMHG | HEART RATE: 78 BPM | WEIGHT: 171 LBS

## 2019-01-18 DIAGNOSIS — Z11.51 SCREENING FOR HPV (HUMAN PAPILLOMAVIRUS): ICD-10-CM

## 2019-01-18 DIAGNOSIS — N89.8 VAGINAL ITCHING: ICD-10-CM

## 2019-01-18 DIAGNOSIS — Z12.4 SCREENING FOR CERVICAL CANCER: ICD-10-CM

## 2019-01-18 DIAGNOSIS — Z13.6 SCREENING FOR CARDIOVASCULAR CONDITION: ICD-10-CM

## 2019-01-18 DIAGNOSIS — Z01.419 WELL FEMALE EXAM WITH ROUTINE GYNECOLOGICAL EXAM: ICD-10-CM

## 2019-01-18 PROCEDURE — 88175 CYTOPATH C/V AUTO FLUID REDO: CPT

## 2019-01-18 PROCEDURE — 87624 HPV HI-RISK TYP POOLED RSLT: CPT

## 2019-01-18 PROCEDURE — 87660 TRICHOMONAS VAGIN DIR PROBE: CPT

## 2019-01-18 PROCEDURE — 87510 GARDNER VAG DNA DIR PROBE: CPT

## 2019-01-18 PROCEDURE — 99395 PREV VISIT EST AGE 18-39: CPT | Performed by: FAMILY MEDICINE

## 2019-01-18 PROCEDURE — 87480 CANDIDA DNA DIR PROBE: CPT

## 2019-01-18 NOTE — PROGRESS NOTES
cc: well exam    Subjective:     Shreya White is a 33 y.o. female presents for a routine preventive health exam.    Ob-Gyn/ History:    /Para:    Last Pap Smear:  in 2016. no history of abnormal pap smears.  Gyn Surgery:  none.  Current Contraceptive Method:  nexplanon. is currently sexually active.  Last menstrual period:  Just ended.  Periods irregular    Health Maintenance  Advanced directive: not discussed   Osteoporosis Screen/ DEXA: n/a   PT/vit D for falls prevention: n/a   Cholesterol Screenin, ordered today   Diabetes Screenin, ordered today  AAA Screening: n/a   Aspirin Use: n/a    Diet: well rounded   Exercise: works out regularly   Substance Abuse: none  Safe in relationship.   Seat belts, bike helmet, gun safety discussed.  Sun protection used.    Cancer screening  Colorectal Cancer Screening: n/a    Lung Cancer Screening: n/a    Cervical Cancer Screening: collected today    Breast Cancer Screening: n/a     Infectious disease screening/Immunizations  --STI Screening: declined   --Practices safe sex.  --HIV Screening: negative in past   --Hepatitis C Screening: n/a   --Immunizations:    Influenza: got at work this season    Tetanus:     Shingles: n/a    Pneumococcal: [id number] recommended (has hx of thyroid ca) but pt declined,        Review of systems:  Denies any weight loss, fevers, fatigue, vision changes, sore throat, swollen glands, rashes, shortness of breath, chest pain, numbness, nausea, vomiting, diarrhea, constipation, abdominal pain, dysuria, hematuria, joint pain, muscle weakness, depression.  Has been having some vaginal itching      Current Outpatient Prescriptions:   •  levothyroxine (SYNTHROID) 137 MCG Tab, Take 1 Tab by mouth Every morning on an empty stomach., Disp: 90 Tab, Rfl: 3  •  Etonogestrel (NEXPLANON SC), Inject  as instructed., Disp: , Rfl:     No Known Allergies    Past Medical History:   Diagnosis Date   • Papillary carcinoma of thyroid  "(HCC) 2012    L 3.4 cm / 4/7 nodes + [id number]   • Papillary carcinoma of thyroid (HCC) 2012    Rx 131 157 mCi   • Postsurgical hypothyroidism 2012   • Prediabetes     A1c = 6.4     Past Surgical History:   Procedure Laterality Date   • THYROIDECTOMY TOTAL      3/24/2012     History reviewed. No pertinent family history.  Social History     Social History   • Marital status:      Spouse name: N/A   • Number of children: N/A   • Years of education: N/A     Occupational History   • Not on file.     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  PERRL, EOMI, no icterus or pallor.  Conjunctivae and lids normal.  External ears normal. Tympanic membranes pearly, opaque.  Nares patent, mucosa pink.  Oropharynx non-erythematous, mucous membranes moist.  Neck supple.  No thyromegaly or masses palpated. No cervical or supraclavicular lymphadenopathy.  Heart:  Regular rate and rhythm.  S1 and S2 normal.  No murmurs appreciated.  Respiratory:  Normal respiratory effort.  Clear to auscultation bilaterally.  Abdomen:  Bowel sounds present.  Non-distended, soft, non-tender, no guarding/rebound. No hepatosplenomegaly.  No hernias.  Pelvic exam: Normal external genitalia including urethral meatus.  Well supported, nontender urethra and bladder.  Vagina without significant lesions.  Extra discharge and odor noted.  Small 1mm " yellow nabothian gland at 9 oclock position of cervix.  No cervical motion tenderness.  Anteflexed uterus of normal size, shape, and consistency.  No adnexal masses or tenderness.     Rectal:  Normal external anus, no hemorrhoids.  Skin:  Warm, dry, no rashes  Musculoskeletal:  Gait is normal.  Moves all extremities well.  Extremities:  No leg edema.  Neurological: No tremors, sensation grossly intact, patellar and biceps reflexes 2+ symmetric, tone/strength normal  Psych:  Affect/mood is normal, judgement is good, memory is intact, grooming is appropriate.    A chaperone was offered to the patient during today's exam. Chaperone name: Marley was present.        Assessment/Plan:     Shreya was seen today for annual exam.    Diagnoses and all orders for this visit:    Well female exam with routine gynecological exam  Screening for cervical cancer  Screening for HPV (human papillomavirus)  -     THINPREP PAP WITH HPV; Future    Screening for cardiovascular condition  -     Lipid Profile; Future  -     BLOOD GLUCOSE; Future    Vaginal itching  -     VAGINAL PATHOGENS DNA PANEL; Future      Patient Counseling:  --Discussed moderation in sodium/caffeine intake, saturated fat and cholesterol, caloric balance, sufficient fresh fruits/vegetables, fiber, iron  --Discussed brushing, flossing, and dental visits.   --Encouraged regular exercise.   --Discussed tobacco, alcohol, or other drug use; availability of treatment for abuse.   --Discussed sexually transmitted infections, partner selection, use of condoms, avoidance of unintended pregnancy and contraceptive alternatives.  --Injury prevention: Discussed safety belts, safety helmets, smoke detector, etc.    Preventive visit in 1 year, sooner as needed for any concerns.

## 2019-01-19 DIAGNOSIS — Z11.51 SCREENING FOR HPV (HUMAN PAPILLOMAVIRUS): ICD-10-CM

## 2019-01-19 DIAGNOSIS — Z12.4 SCREENING FOR CERVICAL CANCER: ICD-10-CM

## 2019-01-19 DIAGNOSIS — Z01.419 WELL FEMALE EXAM WITH ROUTINE GYNECOLOGICAL EXAM: ICD-10-CM

## 2019-01-19 LAB
CANDIDA DNA VAG QL PROBE+SIG AMP: POSITIVE
G VAGINALIS DNA VAG QL PROBE+SIG AMP: NEGATIVE
T VAGINALIS DNA VAG QL PROBE+SIG AMP: NEGATIVE

## 2019-01-19 RX ORDER — FLUCONAZOLE 150 MG/1
150 TABLET ORAL DAILY
Qty: 1 TAB | Refills: 1 | Status: SHIPPED | OUTPATIENT
Start: 2019-01-19 | End: 2019-01-20

## 2019-01-22 LAB
CYTOLOGY REG CYTOL: NORMAL
HPV HR 12 DNA CVX QL NAA+PROBE: NEGATIVE
HPV16 DNA SPEC QL NAA+PROBE: NEGATIVE
HPV18 DNA SPEC QL NAA+PROBE: NEGATIVE
SPECIMEN SOURCE: NORMAL

## 2019-02-11 ENCOUNTER — HOSPITAL ENCOUNTER (OUTPATIENT)
Dept: LAB | Facility: MEDICAL CENTER | Age: 34
End: 2019-02-11
Attending: INTERNAL MEDICINE
Payer: COMMERCIAL

## 2019-02-11 ENCOUNTER — OFFICE VISIT (OUTPATIENT)
Dept: ENDOCRINOLOGY | Facility: MEDICAL CENTER | Age: 34
End: 2019-02-11
Payer: COMMERCIAL

## 2019-02-11 VITALS
HEART RATE: 95 BPM | OXYGEN SATURATION: 95 % | DIASTOLIC BLOOD PRESSURE: 70 MMHG | HEIGHT: 64 IN | BODY MASS INDEX: 29.53 KG/M2 | WEIGHT: 173 LBS | SYSTOLIC BLOOD PRESSURE: 102 MMHG

## 2019-02-11 DIAGNOSIS — E89.0 HYPOTHYROIDISM, POSTSURGICAL: ICD-10-CM

## 2019-02-11 DIAGNOSIS — C73 PAPILLARY CARCINOMA OF THYROID (HCC): ICD-10-CM

## 2019-02-11 DIAGNOSIS — C73 PAPILLARY CARCINOMA OF THYROID (HCC): Primary | ICD-10-CM

## 2019-02-11 LAB
T4 FREE SERPL-MCNC: 1.21 NG/DL (ref 0.53–1.43)
TSH SERPL DL<=0.005 MIU/L-ACNC: 0.04 UIU/ML (ref 0.38–5.33)

## 2019-02-11 PROCEDURE — 84439 ASSAY OF FREE THYROXINE: CPT

## 2019-02-11 PROCEDURE — 99213 OFFICE O/P EST LOW 20 MIN: CPT | Performed by: INTERNAL MEDICINE

## 2019-02-11 PROCEDURE — 36415 COLL VENOUS BLD VENIPUNCTURE: CPT

## 2019-02-11 PROCEDURE — 84432 ASSAY OF THYROGLOBULIN: CPT

## 2019-02-11 PROCEDURE — 86800 THYROGLOBULIN ANTIBODY: CPT

## 2019-02-11 PROCEDURE — 84443 ASSAY THYROID STIM HORMONE: CPT

## 2019-02-12 NOTE — PROGRESS NOTES
"Chief Complaint   Patient presents with   • Thyroid Carcinoma   • Hypothyroidism     Post thyroidectomy        HPI:    See assessment and recommendations below    ROS:  All other systems reported as negative or unchanged since last exam      Allergies: No Known Allergies    Current medicines including changes today:  Current Outpatient Prescriptions   Medication Sig Dispense Refill   • levothyroxine (SYNTHROID) 137 MCG Tab Take 1 Tab by mouth Every morning on an empty stomach. 90 Tab 3   • Etonogestrel (NEXPLANON SC) Inject  as instructed.       No current facility-administered medications for this visit.         Past Medical History:   Diagnosis Date   • Papillary carcinoma of thyroid (HCC) 2012    L 3.4 cm / 4/7 nodes + [id number]   • Papillary carcinoma of thyroid (HCC) 2012    Rx 131 157 mCi   • Postsurgical hypothyroidism 2012   • Prediabetes     A1c = 6.4       PHYSICAL EXAM:    /70 (BP Location: Right arm, Patient Position: Sitting)   Pulse 95   Ht 1.626 m (5' 4\")   Wt 78.5 kg (173 lb)   SpO2 95%   BMI 29.70 kg/m²     Gen.   appears healthy     Skin   appropriate for sex and age    HEENT  unremarkable    Neck   no palpable nodules or other abnormalities in the neck or supraclavicular areas    Heart  regular    Extremities  no edema    Neuro  gait and station normal    Psych  appropriate    ASSESSMENT AND RECOMMENDATIONS    1. Papillary carcinoma of thyroid (HCC) 2012             For details of thyroid carcinoma consult progress note January 22, 2018             No new developments in the neck or thyroid area. Not aware of any new nodules or masses. No voice change, difficulty swallowing, or breathing             No recent lab.  We will update  - THYROGLOBULIN, QT; Future    2. Hypothyroidism, postsurgical             Clinically euthyroid taking levothyroxine 137 mcg/day             Her TSH has been purposely suppressed in the past but I think if current lab is favorable we should reduce that dose " such that TSH is in the low normal range.  We will update lab and decide  - FREE THYROXINE; Future  - TSH; Future      DISPOSITION: Follow-up current lab by my chart or telephone.                            Return to office in 6 months      Josse Mahoney M.D.    Copies to: Shelbie Chapman M.D. [phone]

## 2019-02-17 DIAGNOSIS — C73 PAPILLARY CARCINOMA OF THYROID (HCC): Primary | ICD-10-CM

## 2019-02-17 DIAGNOSIS — E89.0 HYPOTHYROIDISM, POSTSURGICAL: ICD-10-CM

## 2019-04-18 ENCOUNTER — HOSPITAL ENCOUNTER (OUTPATIENT)
Facility: MEDICAL CENTER | Age: 34
End: 2019-04-18
Attending: FAMILY MEDICINE
Payer: COMMERCIAL

## 2019-04-18 ENCOUNTER — OFFICE VISIT (OUTPATIENT)
Dept: MEDICAL GROUP | Facility: MEDICAL CENTER | Age: 34
End: 2019-04-18
Payer: COMMERCIAL

## 2019-04-18 VITALS
HEART RATE: 78 BPM | SYSTOLIC BLOOD PRESSURE: 118 MMHG | DIASTOLIC BLOOD PRESSURE: 70 MMHG | BODY MASS INDEX: 30.05 KG/M2 | HEIGHT: 64 IN | TEMPERATURE: 98.6 F | RESPIRATION RATE: 16 BRPM | WEIGHT: 176 LBS | OXYGEN SATURATION: 96 %

## 2019-04-18 DIAGNOSIS — N89.8 VAGINAL DRYNESS: ICD-10-CM

## 2019-04-18 DIAGNOSIS — N89.8 VAGINA ITCHING: ICD-10-CM

## 2019-04-18 DIAGNOSIS — E66.9 OBESITY (BMI 30-39.9): ICD-10-CM

## 2019-04-18 PROCEDURE — 87480 CANDIDA DNA DIR PROBE: CPT

## 2019-04-18 PROCEDURE — 87660 TRICHOMONAS VAGIN DIR PROBE: CPT

## 2019-04-18 PROCEDURE — 99213 OFFICE O/P EST LOW 20 MIN: CPT | Performed by: FAMILY MEDICINE

## 2019-04-18 PROCEDURE — 87510 GARDNER VAG DNA DIR PROBE: CPT

## 2019-04-18 NOTE — PROGRESS NOTES
"cc:  Vaginal dryness    Subjective:     Shreya White is a 33 y.o. female presenting to discuss vaginal dryness.  Started several months ago, seems to be getting worse.  Associated with vaginal itching as well.  She was positive for a yeast infection in January, completed a course of fluconazole.  Since then, her symptoms have been somewhat worse.  Denies any nausea, vomiting, fevers, abdominal pain, back pain, urinary symptoms, vaginal discharge, odor.      Review of systems:  See above.       Current Outpatient Prescriptions:   •  levothyroxine (SYNTHROID) 137 MCG Tab, Take 1 Tab by mouth Every morning on an empty stomach., Disp: 90 Tab, Rfl: 3  •  Etonogestrel (NEXPLANON SC), Inject  as instructed., Disp: , Rfl:     Allergies, past medical history, past surgical history, family history, social history reviewed and updated    Objective:     Vitals: /70 (BP Location: Right arm, Patient Position: Sitting)   Pulse 78   Temp 37 °C (98.6 °F) (Axillary)   Resp 16   Ht 1.626 m (5' 4\")   Wt 79.8 kg (176 lb)   SpO2 96%   BMI 30.21 kg/m²   General: Alert, pleasant, NAD  HEENT: Normocephalic.    Psych:  Affect/mood is normal, judgement is good, memory is intact, grooming is appropriate.    Assessment/Plan:     Diagnoses and all orders for this visit:    Vaginal dryness  Vagina itching  Will check a vaginal pathogens panel and notify patient on my chart regarding results.  In the meantime, recommended using lubrication during intercourse.  -     VAGINAL PATHOGENS DNA PANEL; Future      "

## 2019-04-20 RX ORDER — FLUCONAZOLE 150 MG/1
150 TABLET ORAL DAILY
Qty: 1 TAB | Refills: 1 | Status: SHIPPED | OUTPATIENT
Start: 2019-04-20 | End: 2019-04-21

## 2019-08-13 ENCOUNTER — HOSPITAL ENCOUNTER (OUTPATIENT)
Dept: LAB | Facility: MEDICAL CENTER | Age: 34
End: 2019-08-13
Attending: INTERNAL MEDICINE
Payer: COMMERCIAL

## 2019-08-13 DIAGNOSIS — E89.0 HYPOTHYROIDISM, POSTSURGICAL: ICD-10-CM

## 2019-08-13 DIAGNOSIS — C73 PAPILLARY CARCINOMA OF THYROID (HCC): ICD-10-CM

## 2019-08-13 LAB
T4 FREE SERPL-MCNC: 1.14 NG/DL (ref 0.53–1.43)
TSH SERPL DL<=0.005 MIU/L-ACNC: 0.24 UIU/ML (ref 0.38–5.33)

## 2019-08-13 PROCEDURE — 36415 COLL VENOUS BLD VENIPUNCTURE: CPT

## 2019-08-13 PROCEDURE — 86800 THYROGLOBULIN ANTIBODY: CPT

## 2019-08-13 PROCEDURE — 84443 ASSAY THYROID STIM HORMONE: CPT

## 2019-08-13 PROCEDURE — 84439 ASSAY OF FREE THYROXINE: CPT

## 2019-08-14 ENCOUNTER — OFFICE VISIT (OUTPATIENT)
Dept: ENDOCRINOLOGY | Facility: MEDICAL CENTER | Age: 34
End: 2019-08-14
Payer: COMMERCIAL

## 2019-08-14 VITALS
WEIGHT: 174.4 LBS | DIASTOLIC BLOOD PRESSURE: 74 MMHG | BODY MASS INDEX: 29.78 KG/M2 | HEIGHT: 64 IN | HEART RATE: 88 BPM | SYSTOLIC BLOOD PRESSURE: 110 MMHG | OXYGEN SATURATION: 99 %

## 2019-08-14 DIAGNOSIS — C73 PAPILLARY CARCINOMA OF THYROID (HCC): ICD-10-CM

## 2019-08-14 DIAGNOSIS — E89.0 HYPOTHYROIDISM, POSTSURGICAL: ICD-10-CM

## 2019-08-14 PROCEDURE — 99213 OFFICE O/P EST LOW 20 MIN: CPT | Performed by: INTERNAL MEDICINE

## 2019-08-15 NOTE — PROGRESS NOTES
"Chief Complaint   Patient presents with   • Thyroid Carcinoma     2012, left 3.4 cm papillary with metastatic nodes   • Hypothyroidism     Post thyroidectomy        HPI:    Metastatic papillary carcinoma thyroid           See my progress note of June 1, 2015 for details of the original pathology.  Thyroidectomy was followed by I-131 ablation with 157 mCi.  There has been no evidence of recurrence since.           Her last soft tissue ultrasound was 2017 showing no evidence of recurrence.  Most recent thyroglobulin unmeasurable in February.  Current thyroglobulin is pending            No new developments in the neck or thyroid area. Not aware of any new nodules or masses. No voice change, difficulty swallowing, or breathing.      Hypothyroidism          Clinically euthyroid taking levothyroxine 137 mcg 6 days/week and 1/2 tablet 1 day/week          Current TSH is slightly suppressed at 0.2 and free thyroxine mid range at 1.1.  No change indicated pending outcome of thyroglobulin    ROS:  All other systems reported as negative or unchanged since last exam      Allergies: No Known Allergies    Current medicines including changes today:  Current Outpatient Medications   Medication Sig Dispense Refill   • levothyroxine (SYNTHROID) 137 MCG Tab Take 1 Tab by mouth Every morning on an empty stomach. 90 Tab 3   • Etonogestrel (NEXPLANON SC) Inject  as instructed.       No current facility-administered medications for this visit.         Past Medical History:   Diagnosis Date   • Papillary carcinoma of thyroid (HCC) 2012    L 3.4 cm / 4/7 nodes + [id number]   • Papillary carcinoma of thyroid (HCC) 2012    Rx 131 157 mCi   • Postsurgical hypothyroidism 2012   • Prediabetes     A1c = 6.4       PHYSICAL EXAM:    /74 (BP Location: Left arm, Patient Position: Sitting, BP Cuff Size: Adult)   Pulse 88   Ht 1.626 m (5' 4.02\")   Wt 79.1 kg (174 lb 6.4 oz)   SpO2 99%   BMI 29.92 kg/m²     Gen.   appears healthy    Skin   " appropriate for sex and age    HEENT  unremarkable    Neck   No palpable nodules in the neck or elsewhere in the supraclavicular spaces    Heart  regular    Extremities  no edema    Neuro  gait and station normal    Psych  appropriate, calm, pleasant    ASSESSMENT AND RECOMMENDATIONS    1. Papillary carcinoma of thyroid (HCC), 2012                See HPI                 Current thyroglobulin is still pending  - ANTITHYROGLOBULIN AB; Future  - THYROGLOBULIN, QT; Future    2. Hypothyroidism, postsurgical              Euthyroid taking levothyroxine 137 my 6 days/week and 1/2 tablet 1 day/week  - FREE THYROXINE; Future  - TSH; Future      DISPOSITION: Assuming thyroglobulin level is low I will see her again in 6 months      Josse Mahoney M.D.    Copies to: Shelbie Chapman M.D. [phone]

## 2019-12-23 DIAGNOSIS — E89.0 HYPOTHYROIDISM, POSTSURGICAL: ICD-10-CM

## 2019-12-23 RX ORDER — LEVOTHYROXINE SODIUM 137 UG/1
137 TABLET ORAL
Qty: 90 TAB | Refills: 3 | Status: SHIPPED
Start: 2019-12-23 | End: 2020-08-28

## 2019-12-23 NOTE — TELEPHONE ENCOUNTER
Was the patient seen in the last year in this department? Yes    Does patient have an active prescription for medications requested? Yes    Received Request Via: Pharmacy     levothyroxine (SYNTHROID) 137 MCG Tab 90 Tab 3/3 12/3/2018    Sig - Route: Take 1 Tab by mouth Every morning on an empty stomach. - Oral

## 2019-12-30 NOTE — TELEPHONE ENCOUNTER
Refill done and sent to pharmacy selected for encounter.      
Was the patient seen in the last year in this department? Yes     Does patient have an active prescription for medications requested? No     Received Request Via: Patient       Nexplanon should be here by 02/17. Patient would like one more refill to make sure she is covered until it can be placed.  
72.6

## 2020-01-13 ENCOUNTER — OFFICE VISIT (OUTPATIENT)
Dept: MEDICAL GROUP | Facility: MEDICAL CENTER | Age: 35
End: 2020-01-13
Payer: COMMERCIAL

## 2020-01-13 VITALS
HEART RATE: 80 BPM | DIASTOLIC BLOOD PRESSURE: 74 MMHG | SYSTOLIC BLOOD PRESSURE: 122 MMHG | OXYGEN SATURATION: 99 % | WEIGHT: 175 LBS | TEMPERATURE: 98.7 F | HEIGHT: 64 IN | BODY MASS INDEX: 29.88 KG/M2 | RESPIRATION RATE: 16 BRPM

## 2020-01-13 DIAGNOSIS — Z97.5 NEXPLANON IN PLACE: ICD-10-CM

## 2020-01-13 DIAGNOSIS — E89.0 HYPOTHYROIDISM, POSTSURGICAL: ICD-10-CM

## 2020-01-13 DIAGNOSIS — Z00.00 WELL WOMAN EXAM (NO GYNECOLOGICAL EXAM): ICD-10-CM

## 2020-01-13 DIAGNOSIS — R73.03 PREDIABETES: ICD-10-CM

## 2020-01-13 DIAGNOSIS — Z13.6 SCREENING FOR CARDIOVASCULAR CONDITION: ICD-10-CM

## 2020-01-13 DIAGNOSIS — Z30.46 ENCOUNTER FOR SURVEILLANCE OF IMPLANTABLE SUBDERMAL CONTRACEPTIVE: ICD-10-CM

## 2020-01-13 PROCEDURE — 99395 PREV VISIT EST AGE 18-39: CPT | Performed by: FAMILY MEDICINE

## 2020-01-13 ASSESSMENT — PATIENT HEALTH QUESTIONNAIRE - PHQ9: CLINICAL INTERPRETATION OF PHQ2 SCORE: 0

## 2020-01-13 NOTE — PROGRESS NOTES
cc: well exam    Subjective:     Shreya White is a 34 y.o. female presents for a routine preventive health exam.    Ob-Gyn/ History:    /Para:    Last Pap Smear:  2019, pap and hpv -.  no history of abnormal pap smears.  Gyn Surgery:  none.  Current Contraceptive Method:  nexplanon. is currently sexually active.  Last menstrual period:  Today, has a period about ever 2 months since getting the nexplanon    Screening/Preventative Topics:  Advanced directive: not discussed   Osteoporosis Screen/ DEXA: n/a   PT/vit D for falls prevention: n/a   Cholesterol Screenin, ordered   Diabetes Screenin, ordered   AAA Screening: n/a   Aspirin Use: n/a    Diet: disussed   Exercise: discussed   Screen for depression: PHQ-2: 0   Substance Abuse: none   Safe in relationship.   Seat belts, bike helmet, gun safety discussed.   Sun protection used.    Cancer screening  Colorectal Cancer Screening: n/a     Lung Cancer Screening: n/a    Cervical Cancer Screening: up to date    Breast Cancer Screening: n/a    Infectious disease screening  --STI Screening: declined   --Practices safe sex.  --HIV Screening: negative in past   --Syphilis Screening: n/a   --Hepatitis C Screening: n/a   --Latent TB Screening: n/a     Immunizations:   Influenza: pt already received at work  Tetanus: 2015  Shingles: n/a  Pneumococcal: n/a    Review of systems:  Denies any weight loss, fevers, fatigue, vision changes, sore throat, swollen glands, rashes, shortness of breath, chest pain, numbness, nausea, vomiting, diarrhea, constipation, abdominal pain, dysuria, hematuria, joint pain, muscle weakness, depression.      Current Outpatient Medications:   •  levothyroxine (SYNTHROID) 137 MCG Tab, Take 1 Tab by mouth Every morning on an empty stomach., Disp: 90 Tab, Rfl: 3  •  Etonogestrel (NEXPLANON SC), Inject  as instructed., Disp: , Rfl:     No Known Allergies    Past Medical History:   Diagnosis Date   • Papillary  carcinoma of thyroid (HCC) 2012    L 3.4 cm / 4/7 nodes + [id number]   • Papillary carcinoma of thyroid (HCC) 2012    Rx 131 157 mCi   • Postsurgical hypothyroidism 2012   • Prediabetes     A1c = 6.4     Past Surgical History:   Procedure Laterality Date   • THYROIDECTOMY TOTAL      3/24/2012     No family history on file.  Social History     Socioeconomic History   • Marital status:      Spouse name: Not on file   • Number of children: Not on file   • Years of education: Not on file   • Highest education level: Not on file   Occupational History   • Not on file   Social Needs   • Financial resource strain: Not on file   • Food insecurity:     Worry: Not on file     Inability: Not on file   • Transportation needs:     Medical: Not on file     Non-medical: Not on file   Tobacco Use   • Smoking status: Never Smoker   • Smokeless tobacco: Never Used   Substance and Sexual Activity   • Alcohol use: Yes     Alcohol/week: 0.0 oz     Comment: rare   • Drug use: No   • Sexual activity: Yes     Partners: Male     Birth control/protection: Implant   Lifestyle   • Physical activity:     Days per week: Not on file     Minutes per session: Not on file   • Stress: Not on file   Relationships   • Social connections:     Talks on phone: Not on file     Gets together: Not on file     Attends Sikh service: Not on file     Active member of club or organization: Not on file     Attends meetings of clubs or organizations: Not on file     Relationship status: Not on file   • Intimate partner violence:     Fear of current or ex partner: Not on file     Emotionally abused: Not on file     Physically abused: Not on file     Forced sexual activity: Not on file   Other Topics Concern   • Not on file   Social History Narrative    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 -one child-baby Josie        Objective:     Vitals: /74 (BP Location: Left arm)   Pulse 80   Temp 37.1 °C (98.7 °F)   Resp 16   Ht 1.626  "m (5' 4\")   Wt 79.4 kg (175 lb)   SpO2 99%   BMI 30.04 kg/m²   General: Alert, pleasant, NAD  HEENT:  Normocephalic.  PERRL, EOMI, no icterus or pallor.  Conjunctivae and lids normal.  External ears normal. Tympanic membranes pearly, opaque.  Nares patent, mucosa pink.  Oropharynx non-erythematous, mucous membranes moist.  Neck supple.  No thyromegaly or masses palpated. No cervical or supraclavicular lymphadenopathy.  Heart:  Regular rate and rhythm.  S1 and S2 normal.  No murmurs appreciated.  Respiratory:  Normal respiratory effort.  Clear to auscultation bilaterally.  Abdomen:  Bowel sounds present.  Non-distended, soft, non-tender, no guarding/rebound. No hepatosplenomegaly.  No hernias.  Skin:  Warm, dry, no rashes  Musculoskeletal:  Gait is normal.  Moves all extremities well.  Extremities: No leg edema.  Neurological: No tremors, sensation grossly intact, patellar and biceps reflexes 2+ symmetric, tone/strength normal  Psych:  Affect/mood is normal, judgement is good, memory is intact, grooming is appropriate.      Assessment/Plan:     Shreya was seen today for annual exam.    Diagnoses and all orders for this visit:    Well woman exam (no gynecological exam)  Patient Counseling:  --Discussed moderation in sodium/caffeine intake, saturated fat and cholesterol, caloric balance, sufficient fresh fruits/vegetables, fiber, iron  --Discussed brushing, flossing, and dental visits.   --Encouraged regular exercise.   --Discussed tobacco, alcohol, or other drug use; availability of treatment for abuse.   --Discussed sexually transmitted infections, partner selection, use of condoms, avoidance of unintended pregnancy and contraceptive alternatives.  --Injury prevention: Discussed safety belts, safety helmets, smoke detector, etc.    Nexplanon in place  Encounter for surveillance of implantable subdermal contraceptive  She will need her Nexplanon replaced in March, will start the paperwork and call patient to get " her Nexplanon removed and a new one inserted once we get the device in clinic    Hypothyroidism, postsurgical  Managed by endocrine  -     Comp Metabolic Panel; Future  -     CBC WITHOUT DIFFERENTIAL; Future    Prediabetes  Stable, continue to monitor  -     Comp Metabolic Panel; Future  -     CBC WITHOUT DIFFERENTIAL; Future  -     HEMOGLOBIN A1C; Future    Screening for cardiovascular condition  -     Lipid Profile; Future      Preventive visit in 1 year, sooner as needed for any concerns.

## 2020-02-10 ENCOUNTER — PATIENT MESSAGE (OUTPATIENT)
Dept: MEDICAL GROUP | Facility: MEDICAL CENTER | Age: 35
End: 2020-02-10

## 2020-02-10 ENCOUNTER — HOSPITAL ENCOUNTER (OUTPATIENT)
Dept: LAB | Facility: MEDICAL CENTER | Age: 35
End: 2020-02-10
Attending: INTERNAL MEDICINE
Payer: COMMERCIAL

## 2020-02-10 ENCOUNTER — HOSPITAL ENCOUNTER (OUTPATIENT)
Dept: LAB | Facility: MEDICAL CENTER | Age: 35
End: 2020-02-10
Attending: FAMILY MEDICINE
Payer: COMMERCIAL

## 2020-02-10 DIAGNOSIS — C73 PAPILLARY CARCINOMA OF THYROID (HCC): ICD-10-CM

## 2020-02-10 DIAGNOSIS — E89.0 HYPOTHYROIDISM, POSTSURGICAL: ICD-10-CM

## 2020-02-10 DIAGNOSIS — R73.03 PREDIABETES: ICD-10-CM

## 2020-02-10 DIAGNOSIS — E66.9 OBESITY (BMI 30-39.9): ICD-10-CM

## 2020-02-10 DIAGNOSIS — Z13.6 SCREENING FOR CARDIOVASCULAR CONDITION: ICD-10-CM

## 2020-02-10 LAB
ALBUMIN SERPL BCP-MCNC: 4 G/DL (ref 3.2–4.9)
ALBUMIN/GLOB SERPL: 1.4 G/DL
ALP SERPL-CCNC: 49 U/L (ref 30–99)
ALT SERPL-CCNC: 12 U/L (ref 2–50)
ANION GAP SERPL CALC-SCNC: 7 MMOL/L (ref 0–11.9)
AST SERPL-CCNC: 16 U/L (ref 12–45)
BILIRUB SERPL-MCNC: 0.6 MG/DL (ref 0.1–1.5)
BUN SERPL-MCNC: 12 MG/DL (ref 8–22)
CALCIUM SERPL-MCNC: 8.5 MG/DL (ref 8.5–10.5)
CHLORIDE SERPL-SCNC: 103 MMOL/L (ref 96–112)
CHOLEST SERPL-MCNC: 125 MG/DL (ref 100–199)
CO2 SERPL-SCNC: 27 MMOL/L (ref 20–33)
CREAT SERPL-MCNC: 0.57 MG/DL (ref 0.5–1.4)
ERYTHROCYTE [DISTWIDTH] IN BLOOD BY AUTOMATED COUNT: 43.6 FL (ref 35.9–50)
EST. AVERAGE GLUCOSE BLD GHB EST-MCNC: 120 MG/DL
FASTING STATUS PATIENT QL REPORTED: NORMAL
GLOBULIN SER CALC-MCNC: 2.9 G/DL (ref 1.9–3.5)
GLUCOSE SERPL-MCNC: 96 MG/DL (ref 65–99)
HBA1C MFR BLD: 5.8 % (ref 0–5.6)
HCT VFR BLD AUTO: 39.7 % (ref 37–47)
HDLC SERPL-MCNC: 41 MG/DL
HGB BLD-MCNC: 13.4 G/DL (ref 12–16)
LDLC SERPL CALC-MCNC: 74 MG/DL
MCH RBC QN AUTO: 29.9 PG (ref 27–33)
MCHC RBC AUTO-ENTMCNC: 33.8 G/DL (ref 33.6–35)
MCV RBC AUTO: 88.6 FL (ref 81.4–97.8)
PLATELET # BLD AUTO: 206 K/UL (ref 164–446)
PMV BLD AUTO: 11.3 FL (ref 9–12.9)
POTASSIUM SERPL-SCNC: 3.6 MMOL/L (ref 3.6–5.5)
PROT SERPL-MCNC: 6.9 G/DL (ref 6–8.2)
RBC # BLD AUTO: 4.48 M/UL (ref 4.2–5.4)
SODIUM SERPL-SCNC: 137 MMOL/L (ref 135–145)
T4 FREE SERPL-MCNC: 1.52 NG/DL (ref 0.53–1.43)
TRIGL SERPL-MCNC: 50 MG/DL (ref 0–149)
TSH SERPL DL<=0.005 MIU/L-ACNC: 0.03 UIU/ML (ref 0.38–5.33)
WBC # BLD AUTO: 5.9 K/UL (ref 4.8–10.8)

## 2020-02-10 PROCEDURE — 85027 COMPLETE CBC AUTOMATED: CPT

## 2020-02-10 PROCEDURE — 86800 THYROGLOBULIN ANTIBODY: CPT

## 2020-02-10 PROCEDURE — 83036 HEMOGLOBIN GLYCOSYLATED A1C: CPT

## 2020-02-10 PROCEDURE — 84439 ASSAY OF FREE THYROXINE: CPT

## 2020-02-10 PROCEDURE — 80061 LIPID PANEL: CPT

## 2020-02-10 PROCEDURE — 84443 ASSAY THYROID STIM HORMONE: CPT

## 2020-02-10 PROCEDURE — 36415 COLL VENOUS BLD VENIPUNCTURE: CPT

## 2020-02-10 PROCEDURE — 84432 ASSAY OF THYROGLOBULIN: CPT

## 2020-02-10 PROCEDURE — 80053 COMPREHEN METABOLIC PANEL: CPT

## 2020-02-12 ENCOUNTER — OFFICE VISIT (OUTPATIENT)
Dept: ENDOCRINOLOGY | Facility: MEDICAL CENTER | Age: 35
End: 2020-02-12
Payer: COMMERCIAL

## 2020-02-12 VITALS
OXYGEN SATURATION: 98 % | SYSTOLIC BLOOD PRESSURE: 100 MMHG | HEART RATE: 93 BPM | HEIGHT: 64 IN | WEIGHT: 173.8 LBS | DIASTOLIC BLOOD PRESSURE: 60 MMHG | BODY MASS INDEX: 29.67 KG/M2

## 2020-02-12 DIAGNOSIS — E89.0 HYPOTHYROIDISM, POSTSURGICAL: ICD-10-CM

## 2020-02-12 DIAGNOSIS — C73 PAPILLARY CARCINOMA OF THYROID (HCC): ICD-10-CM

## 2020-02-12 PROCEDURE — 99213 OFFICE O/P EST LOW 20 MIN: CPT | Performed by: INTERNAL MEDICINE

## 2020-02-20 ENCOUNTER — PATIENT MESSAGE (OUTPATIENT)
Dept: MEDICAL GROUP | Facility: MEDICAL CENTER | Age: 35
End: 2020-02-20

## 2020-03-06 RX ORDER — NORGESTIMATE AND ETHINYL ESTRADIOL 0.25-0.035
1 KIT ORAL DAILY
Qty: 84 TAB | Refills: 3 | Status: SHIPPED | OUTPATIENT
Start: 2020-03-06 | End: 2020-12-15 | Stop reason: SDUPTHER

## 2020-03-16 ENCOUNTER — OFFICE VISIT (OUTPATIENT)
Dept: MEDICAL GROUP | Facility: MEDICAL CENTER | Age: 35
End: 2020-03-16
Payer: COMMERCIAL

## 2020-03-16 VITALS
SYSTOLIC BLOOD PRESSURE: 122 MMHG | OXYGEN SATURATION: 96 % | HEART RATE: 74 BPM | DIASTOLIC BLOOD PRESSURE: 70 MMHG | HEIGHT: 64 IN | TEMPERATURE: 98.6 F | BODY MASS INDEX: 29.02 KG/M2 | RESPIRATION RATE: 16 BRPM | WEIGHT: 170 LBS

## 2020-03-16 DIAGNOSIS — Z30.46 NEXPLANON REMOVAL: ICD-10-CM

## 2020-03-16 PROBLEM — Z97.5 NEXPLANON IN PLACE: Status: RESOLVED | Noted: 2017-03-01 | Resolved: 2020-03-16

## 2020-03-16 PROCEDURE — 11982 REMOVE DRUG IMPLANT DEVICE: CPT | Performed by: FAMILY MEDICINE

## 2020-03-16 ASSESSMENT — FIBROSIS 4 INDEX: FIB4 SCORE: 0.72

## 2020-03-16 NOTE — PROGRESS NOTES
"cc: Nexplanon removal    Subjective:     Shreya White is a 34 y.o. female presenting Nexplanon removal.  Her Nexplanon was due for removal earlier this month.  She started the oral birth control pill, denies any side effects.  Started the pill a little over a week ago.  She feels comfortable continue with the birth control pill.      Review of systems:  See above.       Current Outpatient Medications:   •  norgestimate-ethinyl estradiol (ORTHO-CYCLEN) 0.25-35 MG-MCG per tablet, Take 1 Tab by mouth every day., Disp: 84 Tab, Rfl: 3  •  Multiple Vitamins-Minerals (MULTIVITAMIN GUMMIES ADULT PO), Take  by mouth., Disp: , Rfl:   •  levothyroxine (SYNTHROID) 137 MCG Tab, Take 1 Tab by mouth Every morning on an empty stomach., Disp: 90 Tab, Rfl: 3    Allergies, past medical history, past surgical history, family history, social history reviewed and updated    Objective:     Vitals: /70   Pulse 74   Temp 37 °C (98.6 °F)   Resp 16   Ht 1.626 m (5' 4.02\")   Wt 77.1 kg (170 lb)   SpO2 96%   BMI 29.16 kg/m²   General: Alert, pleasant, NAD  HEENT: Normocephalic.  Skin: Warm, dry, no rashes.  Musculoskeletal: Gait is normal.  Moves all extremities well.  Extremities: No leg edema.  Psych:  Affect/mood is normal, judgement is good, memory is intact, grooming is appropriate.      PROCEDURE NOTE:      Nexplanon removal    Indications for procedure:     Desire for nexplanon removal, due to expiration    Written and verbal consent were obtained after discussion of risks and benefits, including but not limited to bleeding, infection, difficulty with removal, bruising, scarring, and nerve damage.    Procedure:   The proposed procedure was explained to the patient. Risks, side effects, benefits, and alternatives were discussed.  Allergies reviewed.  All questions were answered to the patient's satisfaction.    The patient was placed in the supine position. The emeka was located by palpation in the left arm. The area " was cleaned with alcohol, 2cc of 1% lidocaine with epinephrine was injected just underneath the end of the implant closest to the elbow. Area then cleaned with betadine x 3.   After firmly pressing down on the end of the implant closer to the axilla, a 2-3 mm incision was made with an 11-blade scalpel distally. The emeka was pushed to the incision site and grasped with forceps and gently removed.  Blunt dissection was not needed.  The 4 cm emeka was removed in its entirety.  The incision was dressed with a steri-strip and a pressure dressing was applied. The patient tolerated the procedure well.    The patient was instructed to report any fever, redness, or bleeding. She should keep the area clean and band-aid over the removal site.       Assessment/Plan:     Shreya was seen today for other.    Diagnoses and all orders for this visit:    Nexplanon removal  Nexplanon removed today with no issues.  -     Consent for Amb Surgery/Procedure        Return if symptoms worsen or fail to improve.

## 2020-08-10 ENCOUNTER — OFFICE VISIT (OUTPATIENT)
Dept: ENDOCRINOLOGY | Facility: MEDICAL CENTER | Age: 35
End: 2020-08-10
Attending: INTERNAL MEDICINE
Payer: COMMERCIAL

## 2020-08-10 VITALS
WEIGHT: 171.8 LBS | SYSTOLIC BLOOD PRESSURE: 108 MMHG | DIASTOLIC BLOOD PRESSURE: 72 MMHG | HEART RATE: 76 BPM | BODY MASS INDEX: 29.33 KG/M2 | HEIGHT: 64 IN

## 2020-08-10 DIAGNOSIS — C73 PAPILLARY CARCINOMA OF THYROID (HCC): ICD-10-CM

## 2020-08-10 DIAGNOSIS — E55.9 VITAMIN D DEFICIENCY: ICD-10-CM

## 2020-08-10 DIAGNOSIS — E89.0 HYPOTHYROIDISM, POSTSURGICAL: ICD-10-CM

## 2020-08-10 PROCEDURE — 99211 OFF/OP EST MAY X REQ PHY/QHP: CPT | Performed by: INTERNAL MEDICINE

## 2020-08-10 PROCEDURE — 99213 OFFICE O/P EST LOW 20 MIN: CPT | Performed by: INTERNAL MEDICINE

## 2020-08-10 ASSESSMENT — FIBROSIS 4 INDEX: FIB4 SCORE: 0.74

## 2020-08-11 NOTE — PROGRESS NOTES
"Chief Complaint   Patient presents with   • Thyroid Carcinoma     2015,   • Hypothyroidism     Post thyroidectomy   • Vitamin D Deficiency       HPI:            1.  Metastatic papillary carcinoma thyroid, 2012             No new developments in the neck or thyroid area. Not aware of any new nodules or masses. No voice change, difficulty swallowing, or breathing             Postsurgical I-131 therapy 157 mCi. No post therapy I-131 scan but ultrasounds have been unremarkable since in addition to low Thyroglobulins.             Soft-tissue ultrasound July 2017 showed no evidence of any residual carcinoma.             Current thyroglobulin is 0.1 without interfering antibodies              Last lab February 2020 indicates suppressed TSH 0.03 and free T4 upper normal at 1.5.  Taking levothyroxine 137 mcg 6 days a week and 1/2 tablet 1 day/week.          ROS:  All other systems reported as negative or unchanged since last exam      Allergies: No Known Allergies    Current medicines including changes today:  Current Outpatient Medications   Medication Sig Dispense Refill   • norgestimate-ethinyl estradiol (ORTHO-CYCLEN) 0.25-35 MG-MCG per tablet Take 1 Tab by mouth every day. 84 Tab 3   • levothyroxine (SYNTHROID) 137 MCG Tab Take 1 Tab by mouth Every morning on an empty stomach. 90 Tab 3   • Multiple Vitamins-Minerals (MULTIVITAMIN GUMMIES ADULT PO) Take  by mouth.       No current facility-administered medications for this visit.         Past Medical History:   Diagnosis Date   • Nexplanon in place 3/1/2017    Inserted on 3/1/2017 Removal date 3/1/2020    • Papillary carcinoma of thyroid (HCC) 2012    L 3.4 cm / 4/7 nodes + [id number]   • Papillary carcinoma of thyroid (HCC) 2012    Rx 131 157 mCi   • Postsurgical hypothyroidism 2012   • Prediabetes     A1c = 6.4       PHYSICAL EXAM:    /72 (BP Location: Left arm, Patient Position: Sitting, BP Cuff Size: Adult)   Pulse 76   Ht 1.626 m (5' 4.02\")   Wt 77.9 kg (171 " lb 12.8 oz)   BMI 29.47 kg/m²     Gen.   appears healthy, not overtly thyrotoxic    Skin   appropriate for sex and age    HEENT  unremarkable    Neck   no adenopathy or nodules are palpable in the neck or supraclavicular areas.    Heart  regular    Extremities  no edema    Neuro  gait and station normal, no tremor    Psych  appropriate, calm, pleasant    ASSESSMENT AND RECOMMENDATIONS    1. Papillary carcinoma of thyroid (HCC), 2012             See HPI             Update labs    2. Hypothyroidism, postsurgical           Update lab    3. Vitamin D deficiency            Update lab       DISPOSITION: Follow-up lab by my chart or telephone                            Is stable return in 6 months      Josse Mahoney M.D.    Copies to: Shelbie Chapman M.D. [phone]

## 2020-08-21 ENCOUNTER — HOSPITAL ENCOUNTER (OUTPATIENT)
Dept: LAB | Facility: MEDICAL CENTER | Age: 35
End: 2020-08-21
Attending: INTERNAL MEDICINE
Payer: COMMERCIAL

## 2020-08-21 DIAGNOSIS — E55.9 VITAMIN D DEFICIENCY: ICD-10-CM

## 2020-08-21 DIAGNOSIS — E89.0 HYPOTHYROIDISM, POSTSURGICAL: ICD-10-CM

## 2020-08-21 DIAGNOSIS — C73 PAPILLARY CARCINOMA OF THYROID (HCC): ICD-10-CM

## 2020-08-21 LAB
25(OH)D3 SERPL-MCNC: 42 NG/ML (ref 30–100)
T4 FREE SERPL-MCNC: 1.89 NG/DL (ref 0.93–1.7)
TSH SERPL DL<=0.005 MIU/L-ACNC: 0.01 UIU/ML (ref 0.38–5.33)

## 2020-08-21 PROCEDURE — 82306 VITAMIN D 25 HYDROXY: CPT

## 2020-08-21 PROCEDURE — 84443 ASSAY THYROID STIM HORMONE: CPT

## 2020-08-21 PROCEDURE — 84439 ASSAY OF FREE THYROXINE: CPT

## 2020-08-21 PROCEDURE — 36415 COLL VENOUS BLD VENIPUNCTURE: CPT

## 2020-08-21 PROCEDURE — 86800 THYROGLOBULIN ANTIBODY: CPT

## 2020-08-21 PROCEDURE — 84432 ASSAY OF THYROGLOBULIN: CPT

## 2020-08-28 ENCOUNTER — TELEPHONE (OUTPATIENT)
Dept: ENDOCRINOLOGY | Facility: MEDICAL CENTER | Age: 35
End: 2020-08-28

## 2020-08-28 DIAGNOSIS — E89.0 HYPOTHYROIDISM, POSTSURGICAL: ICD-10-CM

## 2020-08-28 DIAGNOSIS — Z85.850 HX OF PAPILLARY THYROID CARCINOMA: ICD-10-CM

## 2020-08-28 DIAGNOSIS — C73 PAPILLARY CARCINOMA OF THYROID (HCC): ICD-10-CM

## 2020-08-28 RX ORDER — LEVOTHYROXINE SODIUM 0.12 MG/1
125 TABLET ORAL
Qty: 30 TAB | Refills: 5 | Status: SHIPPED | OUTPATIENT
Start: 2020-08-28 | End: 2021-03-12

## 2020-08-28 NOTE — TELEPHONE ENCOUNTER
Telephone conversation with patient    Laboratory data reviewed.  Thyroglobulin is unmeasurable without interfering antibodies.    Free T4 is elevated at 1.8 with upper normal 1.7 and TSH remains purposely suppressed at 0.015.  She has no evidence of residual thyroid carcinoma so we should reduce her thyroid dose.  I would like her TSH to be low normal but near normal.    I will reduce her dose to 125 mcg daily.  In 2 months repeat lab and further discuss.  She is in agreement.    Josse Mahoney M.D.

## 2020-10-22 ENCOUNTER — HOSPITAL ENCOUNTER (OUTPATIENT)
Dept: LAB | Facility: MEDICAL CENTER | Age: 35
End: 2020-10-22
Attending: INTERNAL MEDICINE
Payer: COMMERCIAL

## 2020-10-22 DIAGNOSIS — E89.0 HYPOTHYROIDISM, POSTSURGICAL: ICD-10-CM

## 2020-10-22 DIAGNOSIS — Z85.850 HX OF PAPILLARY THYROID CARCINOMA: ICD-10-CM

## 2020-10-22 LAB
T4 FREE SERPL-MCNC: 2.08 NG/DL (ref 0.93–1.7)
TSH SERPL DL<=0.005 MIU/L-ACNC: 0.01 UIU/ML (ref 0.38–5.33)

## 2020-10-22 PROCEDURE — 36415 COLL VENOUS BLD VENIPUNCTURE: CPT

## 2020-10-22 PROCEDURE — 84432 ASSAY OF THYROGLOBULIN: CPT

## 2020-10-22 PROCEDURE — 84443 ASSAY THYROID STIM HORMONE: CPT

## 2020-10-22 PROCEDURE — 84439 ASSAY OF FREE THYROXINE: CPT

## 2020-10-22 PROCEDURE — 86800 THYROGLOBULIN ANTIBODY: CPT

## 2020-11-04 ENCOUNTER — TELEPHONE (OUTPATIENT)
Dept: ENDOCRINOLOGY | Facility: MEDICAL CENTER | Age: 35
End: 2020-11-04

## 2020-11-04 DIAGNOSIS — E89.0 HYPOTHYROIDISM, POSTSURGICAL: ICD-10-CM

## 2020-11-04 RX ORDER — LEVOTHYROXINE SODIUM 112 MCG
112 TABLET ORAL
Qty: 30 TAB | Refills: 5 | Status: SHIPPED | OUTPATIENT
Start: 2020-11-04 | End: 2021-05-10 | Stop reason: SDUPTHER

## 2020-11-05 NOTE — TELEPHONE ENCOUNTER
Telephone conversation with patient    Patient is using levothyroxine and we lowered her dose from 137 down to 125 mcg/day.  The actual T4 level went up from 1.8 up to 2.0 and the TSH is lower a year is 0.009.  This does not make any sense and may be is the generic are off.  She is certain that she takes her thyroid the same way every day and in this case she is certain she is not overdosing.    We are going to switch to brand Synthroid 112 mcg the next month update lab and discuss again.    Josse Mahoney M.D.

## 2020-12-15 ENCOUNTER — PATIENT MESSAGE (OUTPATIENT)
Dept: MEDICAL GROUP | Facility: MEDICAL CENTER | Age: 35
End: 2020-12-15

## 2020-12-15 RX ORDER — NORGESTIMATE AND ETHINYL ESTRADIOL 0.25-0.035
1 KIT ORAL DAILY
Qty: 84 TAB | Refills: 3 | Status: SHIPPED | OUTPATIENT
Start: 2020-12-15 | End: 2021-12-16 | Stop reason: SDUPTHER

## 2020-12-15 NOTE — TELEPHONE ENCOUNTER
From: Shreya White  To: Shelbie Chapman M.D.  Sent: 12/15/2020 6:32 AM PST  Subject: Non-Urgent Medical Question    Dr. Chapman,     I made an appointment with you this Friday, because I only have 2 packs left of my birth control and no refills.     So I am good for about 2 months. Do I need to go see you for the refill? Is it too early to go see you - since I have 2 months to go?     Let me know and I can change my appointment date.     Thank you,   Shreya White

## 2020-12-21 ENCOUNTER — HOSPITAL ENCOUNTER (OUTPATIENT)
Dept: LAB | Facility: MEDICAL CENTER | Age: 35
End: 2020-12-21
Attending: INTERNAL MEDICINE
Payer: COMMERCIAL

## 2020-12-21 DIAGNOSIS — E89.0 HYPOTHYROIDISM, POSTSURGICAL: ICD-10-CM

## 2020-12-21 DIAGNOSIS — C73 PAPILLARY CARCINOMA OF THYROID (HCC): ICD-10-CM

## 2020-12-21 LAB
T4 FREE SERPL-MCNC: 1.68 NG/DL (ref 0.93–1.7)
TSH SERPL DL<=0.005 MIU/L-ACNC: 0.02 UIU/ML (ref 0.38–5.33)

## 2020-12-21 PROCEDURE — 84439 ASSAY OF FREE THYROXINE: CPT

## 2020-12-21 PROCEDURE — 84432 ASSAY OF THYROGLOBULIN: CPT

## 2020-12-21 PROCEDURE — 36415 COLL VENOUS BLD VENIPUNCTURE: CPT

## 2020-12-21 PROCEDURE — 84443 ASSAY THYROID STIM HORMONE: CPT

## 2020-12-21 PROCEDURE — 86800 THYROGLOBULIN ANTIBODY: CPT

## 2021-02-01 ENCOUNTER — HOSPITAL ENCOUNTER (OUTPATIENT)
Dept: LAB | Facility: MEDICAL CENTER | Age: 36
End: 2021-02-01
Attending: INTERNAL MEDICINE
Payer: COMMERCIAL

## 2021-02-01 DIAGNOSIS — E89.0 HYPOTHYROIDISM, POSTSURGICAL: ICD-10-CM

## 2021-02-01 DIAGNOSIS — C73 PAPILLARY CARCINOMA OF THYROID (HCC): ICD-10-CM

## 2021-02-01 PROCEDURE — 86800 THYROGLOBULIN ANTIBODY: CPT

## 2021-02-01 PROCEDURE — 84439 ASSAY OF FREE THYROXINE: CPT

## 2021-02-01 PROCEDURE — 84443 ASSAY THYROID STIM HORMONE: CPT

## 2021-02-01 PROCEDURE — 36415 COLL VENOUS BLD VENIPUNCTURE: CPT

## 2021-02-02 LAB
T4 FREE SERPL-MCNC: 1.5 NG/DL (ref 0.93–1.7)
TSH SERPL DL<=0.005 MIU/L-ACNC: 0.04 UIU/ML (ref 0.38–5.33)

## 2021-02-03 LAB — THYROGLOB AB SERPL-ACNC: <0.9 IU/ML (ref 0–4)

## 2021-02-04 ENCOUNTER — OFFICE VISIT (OUTPATIENT)
Dept: MEDICAL GROUP | Facility: MEDICAL CENTER | Age: 36
End: 2021-02-04
Payer: COMMERCIAL

## 2021-02-04 VITALS
RESPIRATION RATE: 16 BRPM | WEIGHT: 159 LBS | HEART RATE: 64 BPM | TEMPERATURE: 97.3 F | SYSTOLIC BLOOD PRESSURE: 118 MMHG | BODY MASS INDEX: 27.14 KG/M2 | HEIGHT: 64 IN | DIASTOLIC BLOOD PRESSURE: 76 MMHG | OXYGEN SATURATION: 96 %

## 2021-02-04 DIAGNOSIS — K21.9 GASTROESOPHAGEAL REFLUX DISEASE, UNSPECIFIED WHETHER ESOPHAGITIS PRESENT: ICD-10-CM

## 2021-02-04 DIAGNOSIS — K02.9 DENTAL CAVITIES: ICD-10-CM

## 2021-02-04 DIAGNOSIS — B07.0 PLANTAR WART: ICD-10-CM

## 2021-02-04 PROCEDURE — 99213 OFFICE O/P EST LOW 20 MIN: CPT | Mod: 25 | Performed by: FAMILY MEDICINE

## 2021-02-04 PROCEDURE — 17110 DESTRUCTION B9 LES UP TO 14: CPT | Performed by: FAMILY MEDICINE

## 2021-02-04 RX ORDER — FAMOTIDINE 20 MG/1
20 TABLET, FILM COATED ORAL 2 TIMES DAILY
Qty: 180 TAB | Refills: 1 | Status: SHIPPED
Start: 2021-02-04 | End: 2022-01-25

## 2021-02-04 ASSESSMENT — PATIENT HEALTH QUESTIONNAIRE - PHQ9: CLINICAL INTERPRETATION OF PHQ2 SCORE: 0

## 2021-02-04 ASSESSMENT — FIBROSIS 4 INDEX: FIB4 SCORE: 0.74

## 2021-02-04 NOTE — PROGRESS NOTES
"cc:  GERD    Subjective:     Shreya White is a 35 y.o. female presenting for:    1.  Heartburn: She was at the dentist 2 days ago and her dentist was concerned that she may be having some acid reflux.  She has been getting cavities and on places.  She brushes and flosses regularly.  She does have dry mouth since her thyroid cancer.  She continues to see endocrinology regularly, is on Synthroid 112 mcg daily.  She has been having some weight loss, but is eating healthier.  She denies any heartburn symptoms, gas, burping, cough, fevers, diarrhea, constipation.    2.  Foot lesion: Has a lesion on her foot for the past 9 months or so.  Seems to be getting a second 1.  Is wondering if she has a corn, has been getting it shaved down periodically.  She tried wearing different shoes, this did not seem to help.      Review of systems:  See above.       Current Outpatient Medications:   •  famotidine (PEPCID) 20 MG Tab, Take 1 Tab by mouth 2 times a day., Disp: 180 Tab, Rfl: 1  •  norgestimate-ethinyl estradiol (ORTHO-CYCLEN) 0.25-35 MG-MCG per tablet, Take 1 Tab by mouth every day., Disp: 84 Tab, Rfl: 3  •  SYNTHROID 112 MCG Tab, Take 1 Tab by mouth Every morning on an empty stomach., Disp: 30 Tab, Rfl: 5  •  Multiple Vitamins-Minerals (MULTIVITAMIN GUMMIES ADULT PO), Take  by mouth., Disp: , Rfl:   •  levothyroxine (SYNTHROID) 125 MCG Tab, Take 1 Tab by mouth Every morning on an empty stomach., Disp: 30 Tab, Rfl: 5    Allergies, past medical history, past surgical history, family history, social history reviewed and updated    Objective:     Vitals: /76   Pulse 64   Temp 36.3 °C (97.3 °F)   Resp 16   Ht 1.626 m (5' 4.02\")   Wt 72.1 kg (159 lb)   SpO2 96%   BMI 27.28 kg/m²   General: Alert, pleasant, NAD  HEENT: Normocephalic.    Abdomen: Non-distended, soft  Skin: Warm, dry.  Approximately 1 cm in diameter hyperkeratotic papule with pinpoint pigmented areas in the middle, located on the right foot on " the plantar surface of the midfoot.  Smaller, about 3 mm, hyperkeratotic papule adjacent to this.  Musculoskeletal: Gait is normal.  Moves all extremities well.  Extremities: No leg edema.  Psych:  Affect/mood is normal, judgement is good, memory is intact, grooming is appropriate.      CRYOTHERAPY:  Discussed risks and benefits of cryotherapy. Patient verbally agreed. 3 applications of cryotherapy were applied to the two lesions on the right foot. Patient tolerated procedure well. Aftercare instructions given.      Assessment/Plan:     Shreya was seen today for gastrophageal reflux.    Diagnoses and all orders for this visit:    Gastroesophageal reflux disease, unspecified whether esophagitis present  Dental cavities  Acute, self-limited issue.  We discussed possible GERD, will try a course of famotidine for 6 months.  When she sees her dentist again, they can tell her if it is helping with her teeth and we can continue it indefinitely.  We discussed healthy diet, regular exercise, dental hygiene.  -     famotidine (PEPCID) 20 MG Tab; Take 1 Tab by mouth 2 times a day.    Plantar wart  Self-limited, minor problem.  Debrided and cryotherapy applied today.  Return in 4 weeks if persists        Return in about 6 months (around 8/4/2021) for routine follow up.

## 2021-02-26 ENCOUNTER — TELEPHONE (OUTPATIENT)
Dept: MEDICAL GROUP | Facility: MEDICAL CENTER | Age: 36
End: 2021-02-26

## 2021-02-26 NOTE — TELEPHONE ENCOUNTER
Phone Number Called: [phone]    Call outcome: Did not leave detailed message. Requested call back    Message: Called to inform patient of message below.       ----- Message from Tereso Singer Ass't sent at 2/26/2021  8:50 AM PST -----  Regarding: FW: reschedule pt    ----- Message -----  From: Shelbie Chapman M.D.  Sent: 2/26/2021   6:31 AM PST  To: Tereso Singer Ass't  Subject: reschedule pt                                    Please reschedule. She's coming in today for wart removal and we don;t have liquid nitrogen

## 2021-03-12 ENCOUNTER — OFFICE VISIT (OUTPATIENT)
Dept: MEDICAL GROUP | Facility: MEDICAL CENTER | Age: 36
End: 2021-03-12
Payer: COMMERCIAL

## 2021-03-12 VITALS
SYSTOLIC BLOOD PRESSURE: 116 MMHG | TEMPERATURE: 97.1 F | DIASTOLIC BLOOD PRESSURE: 70 MMHG | WEIGHT: 158 LBS | RESPIRATION RATE: 16 BRPM | BODY MASS INDEX: 26.98 KG/M2 | HEIGHT: 64 IN | OXYGEN SATURATION: 96 % | HEART RATE: 74 BPM

## 2021-03-12 DIAGNOSIS — B07.0 PLANTAR WART: ICD-10-CM

## 2021-03-12 PROCEDURE — 99212 OFFICE O/P EST SF 10 MIN: CPT | Performed by: FAMILY MEDICINE

## 2021-03-12 ASSESSMENT — FIBROSIS 4 INDEX: FIB4 SCORE: 0.74

## 2021-03-12 NOTE — PROGRESS NOTES
"cc:  Plantar wart    Subjective:     Shreya White is a 35 y.o. female presenting for follow-up of her plantar wart.  He has gotten somewhat smaller, but she has noticed that it is more uniform in color, no more black dots.      Review of systems:  See above.       Current Outpatient Medications:   •  famotidine (PEPCID) 20 MG Tab, Take 1 Tab by mouth 2 times a day., Disp: 180 Tab, Rfl: 1  •  norgestimate-ethinyl estradiol (ORTHO-CYCLEN) 0.25-35 MG-MCG per tablet, Take 1 Tab by mouth every day., Disp: 84 Tab, Rfl: 3  •  SYNTHROID 112 MCG Tab, Take 1 Tab by mouth Every morning on an empty stomach., Disp: 30 Tab, Rfl: 5  •  Multiple Vitamins-Minerals (MULTIVITAMIN GUMMIES ADULT PO), Take  by mouth., Disp: , Rfl:     Allergies, past medical history, past surgical history, family history, social history reviewed and updated    Objective:     Vitals: /70   Pulse 74   Temp 36.2 °C (97.1 °F)   Resp 16   Ht 1.626 m (5' 4.02\")   Wt 71.7 kg (158 lb)   SpO2 96%   BMI 27.10 kg/m²   General: Alert, pleasant, NAD  HEENT: Normocephalic.   Skin: Warm, dry.  2 approximately 1 cm in diameter hyperkeratotic papules on the plantar surface of the midfoot.  Smaller, about 1 mm, hyperkeratotic papule adjacent to these.  Musculoskeletal: Gait is normal.  Moves all extremities well.  Extremities: No leg edema.  Psych:  Affect/mood is normal, judgement is good, memory is intact, grooming is appropriate.    CRYOTHERAPY:  Discussed risks and benefits of cryotherapy. Patient verbally agreed. 3 applications of cryotherapy were applied to the three lesions. Patient tolerated procedure well. Aftercare instructions given.      Assessment/Plan:     Shreya was seen today for follow-up.    Diagnoses and all orders for this visit:    Plantar wart  Self-limited issue.  Debrided and cryotherapy applied today.  Return in 3 months if it persists.        No follow-ups on file.      "

## 2021-04-16 ENCOUNTER — HOSPITAL ENCOUNTER (OUTPATIENT)
Dept: LAB | Facility: MEDICAL CENTER | Age: 36
End: 2021-04-16
Attending: INTERNAL MEDICINE
Payer: COMMERCIAL

## 2021-04-16 DIAGNOSIS — E89.0 HYPOTHYROIDISM, POSTSURGICAL: ICD-10-CM

## 2021-04-16 DIAGNOSIS — C73 PAPILLARY CARCINOMA OF THYROID (HCC): ICD-10-CM

## 2021-04-16 LAB
T4 FREE SERPL-MCNC: 1.43 NG/DL (ref 0.93–1.7)
TSH SERPL DL<=0.005 MIU/L-ACNC: 0.21 UIU/ML (ref 0.38–5.33)

## 2021-04-16 PROCEDURE — 84432 ASSAY OF THYROGLOBULIN: CPT

## 2021-04-16 PROCEDURE — 84443 ASSAY THYROID STIM HORMONE: CPT

## 2021-04-16 PROCEDURE — 84439 ASSAY OF FREE THYROXINE: CPT

## 2021-04-16 PROCEDURE — 86800 THYROGLOBULIN ANTIBODY: CPT

## 2021-04-16 PROCEDURE — 36415 COLL VENOUS BLD VENIPUNCTURE: CPT

## 2021-05-02 DIAGNOSIS — E89.0 HYPOTHYROIDISM, POSTSURGICAL: ICD-10-CM

## 2021-05-02 DIAGNOSIS — C73 PAPILLARY CARCINOMA OF THYROID (HCC): ICD-10-CM

## 2021-05-10 DIAGNOSIS — E89.0 HYPOTHYROIDISM, POSTSURGICAL: ICD-10-CM

## 2021-05-10 RX ORDER — LEVOTHYROXINE SODIUM 112 MCG
112 TABLET ORAL
Qty: 30 TABLET | Refills: 5 | Status: SHIPPED | OUTPATIENT
Start: 2021-05-10 | End: 2022-01-25

## 2021-07-20 ENCOUNTER — HOSPITAL ENCOUNTER (OUTPATIENT)
Dept: LAB | Facility: MEDICAL CENTER | Age: 36
End: 2021-07-20
Attending: FAMILY MEDICINE
Payer: COMMERCIAL

## 2021-07-20 DIAGNOSIS — E89.0 HYPOTHYROIDISM, POSTSURGICAL: ICD-10-CM

## 2021-07-20 DIAGNOSIS — C73 PAPILLARY CARCINOMA OF THYROID (HCC): ICD-10-CM

## 2021-07-20 LAB
T4 FREE SERPL-MCNC: 1.4 NG/DL (ref 0.93–1.7)
TSH SERPL DL<=0.005 MIU/L-ACNC: 0.33 UIU/ML (ref 0.38–5.33)

## 2021-07-20 PROCEDURE — 36415 COLL VENOUS BLD VENIPUNCTURE: CPT

## 2021-07-20 PROCEDURE — 84439 ASSAY OF FREE THYROXINE: CPT

## 2021-07-20 PROCEDURE — 84432 ASSAY OF THYROGLOBULIN: CPT

## 2021-07-20 PROCEDURE — 86800 THYROGLOBULIN ANTIBODY: CPT

## 2021-07-20 PROCEDURE — 84443 ASSAY THYROID STIM HORMONE: CPT

## 2021-08-03 ENCOUNTER — OFFICE VISIT (OUTPATIENT)
Dept: ENDOCRINOLOGY | Facility: MEDICAL CENTER | Age: 36
End: 2021-08-03
Attending: INTERNAL MEDICINE
Payer: COMMERCIAL

## 2021-08-03 VITALS
DIASTOLIC BLOOD PRESSURE: 72 MMHG | HEIGHT: 64 IN | HEART RATE: 92 BPM | SYSTOLIC BLOOD PRESSURE: 116 MMHG | BODY MASS INDEX: 27.86 KG/M2 | OXYGEN SATURATION: 97 % | WEIGHT: 163.2 LBS

## 2021-08-03 DIAGNOSIS — E89.0 HYPOTHYROIDISM, POSTSURGICAL: ICD-10-CM

## 2021-08-03 DIAGNOSIS — C73 PAPILLARY CARCINOMA OF THYROID (HCC): ICD-10-CM

## 2021-08-03 PROCEDURE — 99214 OFFICE O/P EST MOD 30 MIN: CPT | Performed by: INTERNAL MEDICINE

## 2021-08-03 RX ORDER — LEVOTHYROXINE SODIUM 112 MCG
112 TABLET ORAL
Qty: 90 TABLET | Refills: 2 | Status: SHIPPED | OUTPATIENT
Start: 2021-08-03 | End: 2022-03-24

## 2021-08-03 ASSESSMENT — FIBROSIS 4 INDEX: FIB4 SCORE: 0.76

## 2021-08-03 NOTE — PROGRESS NOTES
Chief Complaint   Patient presents with   • Thyroid Carcinoma     2012 metastatic papillary / S/P I 131 Rx   • Hypothyroidism     Post thyroidectomy        HPI:        No new developments in the neck or thyroid area. Not aware of any new nodules or masses. No voice change, difficulty swallowing, or breathing                   Postsurgical I-131 therapy 157 mCi. No post therapy I-131 scan but ultrasounds have been unremarkable since in addition to low Thyroglobulins.             Soft-tissue ultrasound July 2017 showed no evidence of any residual carcinoma.             Current thyroglobulin is <0.1 without interfering antibodies           My goal for thyroid hormone maintenance is to keep the TSH low normal but not abnormally low.  Current TSH level is 0.3 and free T4 in normal range at 1.4 (0.9-1.7).  She is clinically euthyroid taking Synthroid 112 mcg 6 days a week and 1/2 tablet 1 day/week.  She uses a weekly pill organizer    ROS:  All other systems reported as negative or unchanged since last exam      Allergies: No Known Allergies    Current medicines including changes today:  Current Outpatient Medications   Medication Sig Dispense Refill   • SYNTHROID 112 MCG Tab Take 1 tablet by mouth every morning on an empty stomach. 90 tablet 2   • SYNTHROID 112 MCG Tab Take 1 tablet by mouth every morning on an empty stomach. 30 tablet 5   • norgestimate-ethinyl estradiol (ORTHO-CYCLEN) 0.25-35 MG-MCG per tablet Take 1 Tab by mouth every day. 84 Tab 3   • Multiple Vitamins-Minerals (MULTIVITAMIN GUMMIES ADULT PO) Take  by mouth.     • famotidine (PEPCID) 20 MG Tab Take 1 Tab by mouth 2 times a day. (Patient not taking: Reported on 8/3/2021) 180 Tab 1     No current facility-administered medications for this visit.        Past Medical History:   Diagnosis Date   • Nexplanon in place 3/1/2017    Inserted on 3/1/2017 Removal date 3/1/2020    • Papillary carcinoma of thyroid (HCC) 2012    L 3.4 cm / 4/7 nodes + [id number]   •  "Papillary carcinoma of thyroid (HCC) 2012    Rx 131 157 mCi   • Postsurgical hypothyroidism 2012   • Prediabetes     A1c = 6.4       PHYSICAL EXAM:    /72   Pulse 92   Ht 1.626 m (5' 4\")   Wt 74 kg (163 lb 3.2 oz)   SpO2 97%   BMI 28.01 kg/m²     Gen.   appears healthy     Skin   appropriate for sex and age    HEENT  unremarkable    Neck   no adenopathy or nodules palpable in the area of the thyroid or elsewhere in the neck or supraclavicular areas    Heart  regular    Extremities  no edema    Neuro  gait and station normal, no tremor    Psych  appropriate, calm, good spirits    ASSESSMENT AND RECOMMENDATIONS    1. Hypothyroidism, postsurgical             Currently adequately replaced with Synthroid 112 mcg 6 days a week and 1/2 tablet 1 day/week which gives her a TSH low normal but normal    2. Papillary carcinoma of thyroid (HCC), 2012             No evidence of recurrence       DISPOSITION: Follow-up in 6 months      Josse Mahoney M.D.    Copies to: Shelbie Chapman M.D. [phone]  "

## 2021-12-15 ENCOUNTER — PATIENT MESSAGE (OUTPATIENT)
Dept: MEDICAL GROUP | Facility: MEDICAL CENTER | Age: 36
End: 2021-12-15

## 2021-12-16 RX ORDER — NORGESTIMATE AND ETHINYL ESTRADIOL 0.25-0.035
1 KIT ORAL DAILY
Qty: 84 TABLET | Refills: 3 | Status: SHIPPED | OUTPATIENT
Start: 2021-12-16 | End: 2022-11-28 | Stop reason: SDUPTHER

## 2021-12-16 NOTE — TELEPHONE ENCOUNTER
From: Shreya White  To: Physician Shelbie Chapman  Sent: 12/15/2021 8:23 PM PST  Subject: Sprintec    Dr. Chapman,     I no longer have refills available for my sprintec prescription. Could I get more refills?     Is this something that you could do for me? Or do I have to go in?     Thanks,  Shreya

## 2022-01-18 ENCOUNTER — TELEPHONE (OUTPATIENT)
Dept: ENDOCRINOLOGY | Facility: MEDICAL CENTER | Age: 37
End: 2022-01-18

## 2022-01-18 NOTE — TELEPHONE ENCOUNTER
Returned vm and notified them that to schedule a N2U we would need a referral and asked that they reach out to their PCP  
no

## 2022-01-25 ENCOUNTER — OFFICE VISIT (OUTPATIENT)
Dept: MEDICAL GROUP | Facility: MEDICAL CENTER | Age: 37
End: 2022-01-25
Payer: COMMERCIAL

## 2022-01-25 VITALS
BODY MASS INDEX: 27.31 KG/M2 | RESPIRATION RATE: 16 BRPM | OXYGEN SATURATION: 98 % | HEIGHT: 64 IN | WEIGHT: 160 LBS | TEMPERATURE: 97.6 F | SYSTOLIC BLOOD PRESSURE: 114 MMHG | HEART RATE: 64 BPM | DIASTOLIC BLOOD PRESSURE: 70 MMHG

## 2022-01-25 DIAGNOSIS — C73 PAPILLARY CARCINOMA OF THYROID (HCC): ICD-10-CM

## 2022-01-25 DIAGNOSIS — E89.0 HYPOTHYROIDISM, POSTSURGICAL: ICD-10-CM

## 2022-01-25 DIAGNOSIS — E55.9 VITAMIN D DEFICIENCY: ICD-10-CM

## 2022-01-25 DIAGNOSIS — Z13.6 SCREENING FOR CARDIOVASCULAR CONDITION: ICD-10-CM

## 2022-01-25 DIAGNOSIS — Z00.00 WELL WOMAN EXAM (NO GYNECOLOGICAL EXAM): ICD-10-CM

## 2022-01-25 DIAGNOSIS — R73.03 PREDIABETES: ICD-10-CM

## 2022-01-25 PROCEDURE — 99395 PREV VISIT EST AGE 18-39: CPT | Performed by: FAMILY MEDICINE

## 2022-01-25 ASSESSMENT — PATIENT HEALTH QUESTIONNAIRE - PHQ9: CLINICAL INTERPRETATION OF PHQ2 SCORE: 0

## 2022-01-25 ASSESSMENT — FIBROSIS 4 INDEX: FIB4 SCORE: 0.76

## 2022-01-25 NOTE — PROGRESS NOTES
cc: well exam    Subjective:     Shreya White is a 36 y.o. female presents for a routine preventive health exam.    Ob-Gyn/ History:    /Para:    Last Pap Smear:  2019. no history of abnormal pap smears.  Gyn Surgery:  none.  Current Contraceptive Method:  Ocp.  is currently sexually active.    Screening/Preventative Topics:  Advanced directive: not discussed   Osteoporosis Screen/ DEXA: n/a   PT/vit D for falls prevention: n/a   Cholesterol Screening: ordered   Diabetes Screening: ordered   AAA Screening: n/a   Aspirin Use: n/a    Diet: healthy   Exercise: discussed   Screen for depression: PHQ-2: 0   Substance Abuse: none   Safe in relationship.   Seat belts, bike helmet, gun safety discussed.   Sun protection used.    Cancer screening  Colorectal Cancer Screening: n/a     Lung Cancer Screening: n/a    Cervical Cancer Screenin2019    Breast Cancer Screening: n/a     Infectious disease screening  --STI Screening: declined   --Practices safe sex.  --HIV Screening: negative   --Syphilis Screening: n/a   --Hepatitis C Screening: n/a   --Latent TB Screening: n/a     Immunizations:   Influenza: pt received earlier this season  HPV: n/a  Hepatitis B: n/a   Tetanus: 2015  Shingles: n/a  Pneumococcal: n/a      Review of systems:  Denies any weight loss, fevers, fatigue, vision changes, sore throat, swollen glands, rashes, shortness of breath, chest pain, numbness, nausea, vomiting, diarrhea, constipation, abdominal pain, dysuria, hematuria, joint pain, muscle weakness, depression      Current Outpatient Medications:   •  norgestimate-ethinyl estradiol (ORTHO-CYCLEN) 0.25-35 MG-MCG per tablet, Take 1 Tablet by mouth every day., Disp: 84 Tablet, Rfl: 3  •  SYNTHROID 112 MCG Tab, Take 1 tablet by mouth every morning on an empty stomach., Disp: 90 tablet, Rfl: 2  •  Multiple Vitamins-Minerals (MULTIVITAMIN GUMMIES ADULT PO), Take  by mouth., Disp: , Rfl:     No Known Allergies    Past Medical  History:   Diagnosis Date   • Nexplanon in place 3/1/2017    Inserted on 3/1/2017 Removal date 3/1/2020    • Papillary carcinoma of thyroid (HCC) 2012    L 3.4 cm / 4/7 nodes + [id number]   • Papillary carcinoma of thyroid (HCC) 2012    Rx 131 157 mCi   • Postsurgical hypothyroidism 2012   • Prediabetes     A1c = 6.4     Past Surgical History:   Procedure Laterality Date   • THYROIDECTOMY TOTAL      3/24/2012     History reviewed. No pertinent family history.  Social History     Socioeconomic History   • Marital status:      Spouse name: Not on file   • Number of children: Not on file   • Years of education: Not on file   • Highest education level: Not on file   Occupational History   • Not on file   Tobacco Use   • Smoking status: Never Smoker   • Smokeless tobacco: Never Used   Vaping Use   • Vaping Use: Never used   Substance and Sexual Activity   • Alcohol use: Yes     Alcohol/week: 0.0 oz     Comment: rare   • Drug use: No   • Sexual activity: Yes     Partners: Male     Birth control/protection: Pill   Other Topics Concern   • Not on file   Social History Narrative    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      Social Determinants of Health     Financial Resource Strain:    • Difficulty of Paying Living Expenses: Not on file   Food Insecurity:    • Worried About Running Out of Food in the Last Year: Not on file   • Ran Out of Food in the Last Year: Not on file   Transportation Needs:    • Lack of Transportation (Medical): Not on file   • Lack of Transportation (Non-Medical): Not on file   Physical Activity:    • Days of Exercise per Week: Not on file   • Minutes of Exercise per Session: Not on file   Stress:    • Feeling of Stress : Not on file   Social Connections:    • Frequency of Communication with Friends and Family: Not on file   • Frequency of Social Gatherings with Friends and Family: Not on file   • Attends Faith Services: Not on file   • Active  "Member of Clubs or Organizations: Not on file   • Attends Club or Organization Meetings: Not on file   • Marital Status: Not on file   Intimate Partner Violence:    • Fear of Current or Ex-Partner: Not on file   • Emotionally Abused: Not on file   • Physically Abused: Not on file   • Sexually Abused: Not on file   Housing Stability:    • Unable to Pay for Housing in the Last Year: Not on file   • Number of Places Lived in the Last Year: Not on file   • Unstable Housing in the Last Year: Not on file       Objective:     Vitals: /70   Pulse 64   Temp 36.4 °C (97.6 °F)   Resp 16   Ht 1.626 m (5' 4\")   Wt 72.6 kg (160 lb)   SpO2 98%   BMI 27.46 kg/m²   General: Alert, pleasant, NAD  HEENT:  Normocephalic.  PERRL, EOMI, no icterus or pallor.  Conjunctivae and lids normal.  External ears normal. Tympanic membranes pearly, opaque.  No thyromegaly or masses palpated. No cervical or supraclavicular lymphadenopathy.  Heart:  Regular rate and rhythm.  S1 and S2 normal.  No murmurs appreciated.  Respiratory:  Normal respiratory effort.  Clear to auscultation bilaterally.  Abdomen:  Bowel sounds present.  Non-distended, soft, non-tender, no guarding/rebound. No hepatosplenomegaly.  No hernias.   Skin:  Warm, dry, no rashes  Musculoskeletal:  Gait is normal.  Moves all extremities well.  Extremities:  No leg edema.  Neurological: No tremors, sensation grossly intact, patellar and biceps reflexes 2+ symmetric, tone/strength normal  Psych:  Affect/mood is normal, judgement is good, memory is intact, grooming is appropriate.      Assessment/Plan:     Shreya was seen today for annual exam.    Diagnoses and all orders for this visit:    Well woman exam (no gynecological exam)  Patient Counseling:  --Discussed moderation in sodium/caffeine intake, saturated fat and cholesterol, caloric balance, sufficient fresh fruits/vegetables, fiber, iron  --Discussed brushing, flossing, and dental visits.   --Encouraged regular " exercise.   --Discussed tobacco, alcohol, or other drug use; availability of treatment for abuse.   --Injury prevention: Discussed safety belts, safety helmets, smoke detector, etc.    Screening for cardiovascular condition  -     Lipid Profile; Future    Prediabetes  -     Basic Metabolic Panel; Future  -     HEMOGLOBIN A1C; Future    Vitamin D deficiency  -     VITAMIN D,25 HYDROXY; Future    Hypothyroidism, postsurgical  Papillary carcinoma of thyroid (HCC)  -     Referral to Endocrinology  -     TSH; Future  -     FREE THYROXINE; Future  -     ANTITHYROGLOBULIN AB; Future  -     THYROGLOBULIN, QT; Future      Preventive visit in 1 year, sooner as needed for any concerns.

## 2022-04-26 ENCOUNTER — RX ONLY (OUTPATIENT)
Age: 37
Setting detail: RX ONLY
End: 2022-04-26

## 2022-04-26 RX ORDER — CLOBETASOL PROPIONATE 0.5 MG/G
CREAM TOPICAL
Qty: 30 | Refills: 1 | Status: ERX | COMMUNITY
Start: 2022-04-26

## 2022-07-04 ENCOUNTER — OFFICE VISIT (OUTPATIENT)
Dept: URGENT CARE | Facility: PHYSICIAN GROUP | Age: 37
End: 2022-07-04
Payer: COMMERCIAL

## 2022-07-04 VITALS
RESPIRATION RATE: 18 BRPM | HEART RATE: 95 BPM | WEIGHT: 162 LBS | TEMPERATURE: 98.4 F | HEIGHT: 65 IN | OXYGEN SATURATION: 98 % | BODY MASS INDEX: 26.99 KG/M2 | DIASTOLIC BLOOD PRESSURE: 70 MMHG | SYSTOLIC BLOOD PRESSURE: 108 MMHG

## 2022-07-04 DIAGNOSIS — J06.9 UPPER RESPIRATORY TRACT INFECTION, UNSPECIFIED TYPE: ICD-10-CM

## 2022-07-04 LAB
EXTERNAL QUALITY CONTROL: NORMAL
SARS-COV+SARS-COV-2 AG RESP QL IA.RAPID: NEGATIVE

## 2022-07-04 PROCEDURE — 99212 OFFICE O/P EST SF 10 MIN: CPT | Performed by: STUDENT IN AN ORGANIZED HEALTH CARE EDUCATION/TRAINING PROGRAM

## 2022-07-04 PROCEDURE — 87426 SARSCOV CORONAVIRUS AG IA: CPT | Performed by: STUDENT IN AN ORGANIZED HEALTH CARE EDUCATION/TRAINING PROGRAM

## 2022-07-04 ASSESSMENT — ENCOUNTER SYMPTOMS
NAUSEA: 0
EYE DISCHARGE: 0
EYE PAIN: 0
ABDOMINAL PAIN: 0
FEVER: 0
SPUTUM PRODUCTION: 0
DIZZINESS: 0
CHILLS: 0
EYE REDNESS: 0
SINUS PAIN: 0
WHEEZING: 0
COUGH: 0
SHORTNESS OF BREATH: 0
MUSCULOSKELETAL NEGATIVE: 1
SORE THROAT: 1
PALPITATIONS: 0
DIARRHEA: 0
HEADACHES: 0
VOMITING: 0

## 2022-07-04 NOTE — PROGRESS NOTES
"Subjective     Shreya White is a 36 y.o. female who presents with Runny Nose (Sneezing,x4 days)            Patient presents to day with nasal congestion, sore throat and sneezing. She states she started feeling symptoms on Saturday.  Her son has been sick since Wednesday.  She has tried no over-the-counter medications to provide relief of symptoms.  She is concerned her and her son may have COVID.  Patient requesting COVID testing in office today.      Review of Systems   Constitutional: Negative for chills and fever.   HENT: Positive for congestion and sore throat. Negative for sinus pain.    Eyes: Negative for pain, discharge and redness.   Respiratory: Negative for cough, sputum production, shortness of breath and wheezing.    Cardiovascular: Negative for chest pain and palpitations.   Gastrointestinal: Negative for abdominal pain, diarrhea, nausea and vomiting.   Genitourinary: Negative.    Musculoskeletal: Negative.    Neurological: Negative for dizziness and headaches.              Objective     /70 (BP Location: Right arm, Patient Position: Sitting, BP Cuff Size: Adult)   Pulse 95   Temp 36.9 °C (98.4 °F) (Temporal)   Resp 18   Ht 1.651 m (5' 5\")   Wt 73.5 kg (162 lb)   SpO2 98%   BMI 26.96 kg/m²      Physical Exam  Vitals reviewed.   Constitutional:       Appearance: Normal appearance.   HENT:      Right Ear: Tympanic membrane and ear canal normal.      Left Ear: Tympanic membrane and ear canal normal.      Nose: Rhinorrhea present.      Mouth/Throat:      Mouth: Mucous membranes are moist.      Pharynx: Oropharynx is clear. No oropharyngeal exudate or posterior oropharyngeal erythema.   Eyes:      Extraocular Movements: Extraocular movements intact.      Conjunctiva/sclera: Conjunctivae normal.      Pupils: Pupils are equal, round, and reactive to light.   Cardiovascular:      Rate and Rhythm: Normal rate and regular rhythm.      Heart sounds: Normal heart sounds.   Pulmonary:      " Effort: Pulmonary effort is normal.      Breath sounds: Normal breath sounds.   Lymphadenopathy:      Cervical: No cervical adenopathy.   Skin:     General: Skin is warm and dry.      Capillary Refill: Capillary refill takes less than 2 seconds.   Neurological:      General: No focal deficit present.      Mental Status: She is alert and oriented to person, place, and time.                             Assessment & Plan        1. Upper respiratory tract infection, unspecified type  - POCT SARS-COV Antigen MANDO (Symptomatic only): Negative    Patient educated on supportive care measures for her symptoms. Supportive measures may include:  Increase daily water intake and make sure getting adequate rest.  Over-the-counter analgesics and antipyretics (i.e. NSAIDs and acetaminophen) can be used for pain and fever relief as needed.  Mechanical saline irrigation and intranasal saline spray may temporarily improve nasal patency and loosen secretions.   Mucolytics (i.e. Mucinex) can be used thin secretions and may promote ease of mucus drainage and clearance.  Inhalation of warm/humidified air (steam) may provide relief of symptoms.    I personally reviewed prior external notes and test results pertinent to today's visit.    Differential diagnoses, supportive care, and indications for immediate follow-up discussed with patient. Pathogenesis of diagnosis discussed including typical length and natural progression.      Instructed to return to urgent care or nearest emergency department if symptoms fail to improve, for any change in condition, further concerns, or new concerning symptoms.    Patient states understanding and agree with the plan of care and discharge instructions.

## 2022-08-08 ENCOUNTER — HOSPITAL ENCOUNTER (OUTPATIENT)
Dept: LAB | Facility: MEDICAL CENTER | Age: 37
End: 2022-08-08
Attending: FAMILY MEDICINE
Payer: COMMERCIAL

## 2022-08-08 DIAGNOSIS — R73.03 PREDIABETES: ICD-10-CM

## 2022-08-08 DIAGNOSIS — C73 PAPILLARY CARCINOMA OF THYROID (HCC): ICD-10-CM

## 2022-08-08 DIAGNOSIS — E55.9 VITAMIN D DEFICIENCY: ICD-10-CM

## 2022-08-08 DIAGNOSIS — Z13.6 SCREENING FOR CARDIOVASCULAR CONDITION: ICD-10-CM

## 2022-08-08 DIAGNOSIS — E89.0 HYPOTHYROIDISM, POSTSURGICAL: ICD-10-CM

## 2022-08-08 LAB
T4 FREE SERPL-MCNC: 1.3 NG/DL (ref 0.93–1.7)
TSH SERPL DL<=0.005 MIU/L-ACNC: 1.38 UIU/ML (ref 0.38–5.33)

## 2022-08-08 PROCEDURE — 86800 THYROGLOBULIN ANTIBODY: CPT

## 2022-08-08 PROCEDURE — 84439 ASSAY OF FREE THYROXINE: CPT

## 2022-08-08 PROCEDURE — 84432 ASSAY OF THYROGLOBULIN: CPT

## 2022-08-08 PROCEDURE — 84443 ASSAY THYROID STIM HORMONE: CPT

## 2022-08-08 PROCEDURE — 36415 COLL VENOUS BLD VENIPUNCTURE: CPT

## 2022-08-09 ENCOUNTER — OFFICE VISIT (OUTPATIENT)
Dept: ENDOCRINOLOGY | Facility: MEDICAL CENTER | Age: 37
End: 2022-08-09
Attending: INTERNAL MEDICINE
Payer: COMMERCIAL

## 2022-08-09 VITALS
HEART RATE: 115 BPM | OXYGEN SATURATION: 99 % | HEIGHT: 65 IN | DIASTOLIC BLOOD PRESSURE: 88 MMHG | BODY MASS INDEX: 27.32 KG/M2 | SYSTOLIC BLOOD PRESSURE: 122 MMHG | WEIGHT: 164 LBS

## 2022-08-09 DIAGNOSIS — E89.0 HYPOTHYROIDISM, POSTSURGICAL: ICD-10-CM

## 2022-08-09 DIAGNOSIS — E55.9 VITAMIN D DEFICIENCY: ICD-10-CM

## 2022-08-09 DIAGNOSIS — C73 PAPILLARY CARCINOMA OF THYROID (HCC): ICD-10-CM

## 2022-08-09 PROCEDURE — 99214 OFFICE O/P EST MOD 30 MIN: CPT | Performed by: INTERNAL MEDICINE

## 2022-08-09 PROCEDURE — 99211 OFF/OP EST MAY X REQ PHY/QHP: CPT | Performed by: INTERNAL MEDICINE

## 2022-08-09 RX ORDER — LEVOTHYROXINE SODIUM 112 MCG
112 TABLET ORAL
Qty: 90 TABLET | Refills: 2 | Status: SHIPPED | OUTPATIENT
Start: 2022-08-09 | End: 2023-03-29

## 2022-08-09 NOTE — PROGRESS NOTES
REASON FOR CONSULTATION:  Consult requested by Shelbie Chapman M.D. for evaluation of postsurgical hypothyroidism and papillary thyroid carcinoma.    HPI:     Shreya White is a 37 y.o. female, who had initial total thyroidectomy on 2011 at [facility] with pathology revealing papillary thyroid carcinoma 3.4 cm with positive LN 4 out 7 with the largest metastatic focus of 6mm.   She did not have problems with postoperative hypocalcemia. She was subsequently treated with radioactive iodine  156 millicuries of I-131 at [facility].  Thyrogen stimulation studies were not done.   Neck ultrasound was last done on 7/2017 showing no evidence of recurrence.    Her last unstimulated thyroglobulin was < 0.1 with negative antibodies on 7/2021    She denies hoarseness or voice changes.  She denies any known neck nodules.  She denies shortness of breath or cough.  She denies history of head or neck radiation other than radioactive iodine therapy following thyroidectomy.  She denies perioral or peripheral paresthesias.   Patient denies a  family history of thyroid cancer.     Since last visit, she has remained on Synthroid 112 micrograms daily (1/2 pill on Sunday), which has been her dose since 2 years.  She is feeling well.  Energy level has been excellent.  Weight is Stable.  No palpitations, no frequent diarrhea, or frequent constipation.  No heat or cold intolerance.  No anterior neck symptoms or problems.     I have reviewed approximately 10 pages of records pertaining to her prior thyroid cancer evaluation and treatment.      ROS:      CONS:     No fever, no chills, no weight loss, no fatigue   EYES:      No diplopia, no blurry vision, no redness of eyes, no swelling of eyelids   ENT:    No hearing loss, No ear pain, No sore throat, no dysphagia, no neck swelling   CV:     No chest pain, no palpitations, no claudication, no orthopnea, no PND   PULM:    No SOB, no cough, no hemoptysis, no wheezing    GI:   No nausea,  no vomiting, no diarrhea, no constipation, no bloody stools   :  Passing urine well, no dysuria, no hematuria   ENDO:   No polyuria, no polydipsia, no heat intolerance, no cold intolerance   NEURO: No headaches, no dizziness, no convulsions, no tremors   MUSC:  No joint swellings, no arthralgias, no myalgias, no weakness   SKIN:   No rash, no ulcers, no dry skin   PSYCH:   No depression, no anxiety, no difficulty sleeping       Past Medical History:  Patient Active Problem List    Diagnosis Date Noted   • Obesity (BMI 30-39.9) 01/18/2018   • Vitamin D deficiency 01/05/2017   • Hypothyroidism, postsurgical 06/01/2015   • Prediabetes    • Papillary carcinoma of thyroid (HCC)        Past Surgical History:  Past Surgical History:   Procedure Laterality Date   • THYROIDECTOMY TOTAL      3/24/2012        Allergies:  Patient has no known allergies.     Current Medications:    Current Outpatient Medications:   •  SYNTHROID 112 MCG Tab, TAKE 1 TABLET EVERY MORNING ON AN EMPTY STOMACH FOR POSTPROCEDURAL HYPOTHYROIDISM, Disp: 90 Tablet, Rfl: 0  •  norgestimate-ethinyl estradiol (ORTHO-CYCLEN) 0.25-35 MG-MCG per tablet, Take 1 Tablet by mouth every day., Disp: 84 Tablet, Rfl: 3  •  Multiple Vitamins-Minerals (MULTIVITAMIN GUMMIES ADULT PO), Take  by mouth., Disp: , Rfl:     Social History:  Social History     Socioeconomic History   • Marital status:      Spouse name: Not on file   • Number of children: Not on file   • Years of education: Not on file   • Highest education level: Not on file   Occupational History   • Not on file   Tobacco Use   • Smoking status: Never Smoker   • Smokeless tobacco: Never Used   Vaping Use   • Vaping Use: Never used   Substance and Sexual Activity   • Alcohol use: Yes     Alcohol/week: 0.0 oz     Comment: rare   • Drug use: No   • Sexual activity: Yes     Partners: Male     Birth control/protection: Pill   Other Topics Concern   • Not on file   Social History Narrative    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 -one child-baby boy-Duran      Social Determinants of Health     Financial Resource Strain: Not on file   Food Insecurity: Not on file   Transportation Needs: Not on file   Physical Activity: Not on file   Stress: Not on file   Social Connections: Not on file   Intimate Partner Violence: Not on file   Housing Stability: Not on file        Family History:   No family history on file.      PHYSICAL EXAM:   Vital signs: /88 (BP Location: Left arm, Patient Position: Sitting, BP Cuff Size: Adult)   Pulse (!) 115   Ht 1.651 m (5' 5\")   Wt 74.4 kg (164 lb)   SpO2 99%   BMI 27.29 kg/m²   GENERAL: Well-developed, well-nourished  in no apparent distress.   EYE: No ocular and eyelid asymmetry, Anicteric sclerae,  PERRL, No exophthalmos or lidlag  HENT: Hearing grossly intact, Normocephalic, atraumatic. Pink, moist mucous membranes, No exudate  NECK: Supple. Trachea midline. Thyroid is absent   CARDIOVASCULAR: Regular rate and rhythm. No murmurs, rubs, or gallops.   LUNGS: Clear to auscultation bilaterally   ABDOMEN: Soft, nontender with positive bowel sounds.   EXTREMITIES: No clubbing, cyanosis, or edema.   NEUROLOGICAL: Cranial nerves II-XII are grossly intact   Symmetric reflexes at the patella no proximal muscle weakness, No visible tremor with both outstretched hands  LYMPH: No cervical, supraclavicular,  adenopathy palpated.   SKIN: No rashes, lesions. Turgor is normal.    Labs:  Lab Results   Component Value Date/Time    WBC 5.9 02/10/2020 06:19 AM    RBC 4.48 02/10/2020 06:19 AM    HEMOGLOBIN 13.4 02/10/2020 06:19 AM    MCV 88.6 02/10/2020 06:19 AM    MCH 29.9 02/10/2020 06:19 AM    MCHC 33.8 02/10/2020 06:19 AM    RDW 43.6 02/10/2020 06:19 AM    MPV 11.3 02/10/2020 06:19 AM       Lab Results   Component Value Date/Time    SODIUM 137 02/10/2020 06:19 AM    POTASSIUM 3.6 02/10/2020 06:19 AM    CHLORIDE 103 02/10/2020 06:19 AM    CO2 27 02/10/2020 06:19 AM    ANION " 7.0 02/10/2020 06:19 AM    GLUCOSE 96 02/10/2020 06:19 AM    BUN 12 02/10/2020 06:19 AM    CREATININE 0.57 02/10/2020 06:19 AM    CALCIUM 8.5 02/10/2020 06:19 AM    ASTSGOT 16 02/10/2020 06:19 AM    ALTSGPT 12 02/10/2020 06:19 AM    TBILIRUBIN 0.6 02/10/2020 06:19 AM    ALBUMIN 4.0 02/10/2020 06:19 AM    TOTPROTEIN 6.9 02/10/2020 06:19 AM    GLOBULIN 2.9 02/10/2020 06:19 AM    AGRATIO 1.4 02/10/2020 06:19 AM       Lab Results   Component Value Date/Time    TSHULTRASEN 1.380 08/08/2022 1419     Lab Results   Component Value Date/Time    FREET4 1.30 08/08/2022 1419     No results found for: FREET3  No results found for: THYSTIMIG        Imaging:      ASSESSMENT/PLAN:     1. Hypothyroidism, postsurgical  Stable  Continue Synthroid 112 MCG daily 6 days out of 7 and 1/2 pill on Sunday only  Medication was sent to [facility]  Follow-up in 6 months with labs    2. Papillary carcinoma of thyroid (HCC)  Stable  Excellent response to therapy noted  Repeat quantitative thyroglobulin next year and every 12 months  Schedule for neck ultrasound    3. Vitamin D deficiency  Stable  Continue monitoring      Return in about 6 months (around 2/9/2023).      Thank you kindly for allowing me to participate in the thyroid care plan for this patient.    Mario Abbasi MD, [facility], [facility]  08/09/22    CC:   Shelbie Chapman M.D.

## 2022-11-26 ENCOUNTER — PATIENT MESSAGE (OUTPATIENT)
Dept: MEDICAL GROUP | Facility: MEDICAL CENTER | Age: 37
End: 2022-11-26
Payer: COMMERCIAL

## 2022-11-28 RX ORDER — NORGESTIMATE AND ETHINYL ESTRADIOL 0.25-0.035
1 KIT ORAL DAILY
Qty: 84 TABLET | Refills: 3 | Status: SHIPPED | OUTPATIENT
Start: 2022-11-28 | End: 2022-11-30 | Stop reason: SDUPTHER

## 2022-11-30 RX ORDER — NORGESTIMATE AND ETHINYL ESTRADIOL 0.25-0.035
1 KIT ORAL DAILY
Qty: 84 TABLET | Refills: 3 | Status: SHIPPED | OUTPATIENT
Start: 2022-11-30 | End: 2023-08-14 | Stop reason: SDUPTHER

## 2023-02-15 ENCOUNTER — OFFICE VISIT (OUTPATIENT)
Dept: ENDOCRINOLOGY | Facility: MEDICAL CENTER | Age: 38
End: 2023-02-15
Attending: INTERNAL MEDICINE
Payer: COMMERCIAL

## 2023-02-15 VITALS
BODY MASS INDEX: 27.49 KG/M2 | HEART RATE: 80 BPM | WEIGHT: 165 LBS | DIASTOLIC BLOOD PRESSURE: 70 MMHG | HEIGHT: 65 IN | SYSTOLIC BLOOD PRESSURE: 112 MMHG | OXYGEN SATURATION: 98 %

## 2023-02-15 DIAGNOSIS — E89.0 HYPOTHYROIDISM, POSTSURGICAL: ICD-10-CM

## 2023-02-15 DIAGNOSIS — C73 PAPILLARY CARCINOMA OF THYROID (HCC): ICD-10-CM

## 2023-02-15 DIAGNOSIS — E55.9 VITAMIN D DEFICIENCY: ICD-10-CM

## 2023-02-15 PROCEDURE — 99211 OFF/OP EST MAY X REQ PHY/QHP: CPT | Performed by: INTERNAL MEDICINE

## 2023-02-15 PROCEDURE — 99214 OFFICE O/P EST MOD 30 MIN: CPT | Performed by: INTERNAL MEDICINE

## 2023-02-15 RX ORDER — CHOLECALCIFEROL (VITAMIN D3) 50 MCG
TABLET ORAL DAILY
COMMUNITY

## 2023-02-15 ASSESSMENT — PATIENT HEALTH QUESTIONNAIRE - PHQ9: CLINICAL INTERPRETATION OF PHQ2 SCORE: 0

## 2023-02-16 NOTE — PROGRESS NOTES
CHIEF COMPLAINT: Followup of postsurgical hypothyroidism and papillary thyroid carcinoma.    HPI:   Shreya White is a 37 y.o. female, who had initial total thyroidectomy on 2011 at [facility] with pathology revealing papillary thyroid carcinoma 3.4 cm with positive LN 4 out 7 with the largest metastatic focus of 6mm.   She did not have problems with postoperative hypocalcemia. She was subsequently treated with radioactive iodine  156 millicuries of I-131 at [facility].  Thyrogen stimulation studies were not done.   Neck ultrasound was last done on 7/2017 showing no evidence of recurrence.      Her unstimulated thyroglobulin was less than 0.1 with negative interfering antibodies on August 8, 2022    I ordered a neck ultrasound last time but she was unable to complete it because of insurance issues we discussed that we can complete a neck ultrasound in the office          Since last visit, she has remained on Synthroid 112mcg daily except 1/2 pill on Sunday has been her dose since  12  months.  She is feeling well.  Energy level has been excellent.  Weight is Stable.  No palpitations, no frequent diarrhea, or frequent constipation.  No heat or cold intolerance.  No anterior neck symptoms or problems.  No voice changes.      She was unable to get labs prior to this visit because of insurance changes      Her TSH was 1.38 with a free T4 of 1.3 on August 8, 2022          Patient's medications, allergies, and social histories were reviewed and updated as appropriate.      ROS:      CONS:     No fever, no chills   EYES:     No diplopia, no blurry vision   CV:           No chest pain, no palpitations   PULM:     No SOB, no cough, no hemoptysis.   GI:            No nausea, no vomiting, no diarrhea, no constipation   ENDO:     No polyuria, no polydipsia, no heat intolerance, no cold intolerance       Past Medical History:  Problem List:  2018-01: Obesity (BMI 30-39.9)  2017-03: Nexplanon in place  2017-01: Vitamin D  "deficiency  2016-11: Family planning  2015-06: Hypothyroidism, postsurgical  Prediabetes  Papillary carcinoma of thyroid (HCC)      Past Surgical History:  Past Surgical History:   Procedure Laterality Date    THYROIDECTOMY TOTAL      3/24/2012        Allergies:  Patient has no known allergies.     Social History:  Social History     Tobacco Use    Smoking status: Never    Smokeless tobacco: Never   Vaping Use    Vaping Use: Never used   Substance Use Topics    Alcohol use: Yes     Alcohol/week: 0.0 oz     Comment: rare    Drug use: No        Family History:   family history is not on file.      PHYSICAL EXAM:   Vital signs: /70   Pulse 80   Ht 1.651 m (5' 5\")   Wt 74.8 kg (165 lb)   SpO2 98%   BMI 27.46 kg/m²   GENERAL: Well-developed, well-nourished in no apparent distress.   EYE:  No ocular asymmetry, PERRLA  HENT: Pink, moist mucous membranes.    NECK: Well healed transverse scar, Thyroid is surgically absent   CARDIOVASCULAR:  No murmurs  LUNGS: Clear breath sounds  ABDOMEN: Soft, nontender   EXTREMITIES: No clubbing, cyanosis, or edema.   NEUROLOGICAL: No gross focal motor abnormalities   LYMPH: No cervical adenopathy palpated.   SKIN: No rashes, lesions.       Labs:  Lab Results   Component Value Date/Time    WBC 5.9 02/10/2020 06:19 AM    RBC 4.48 02/10/2020 06:19 AM    HEMOGLOBIN 13.4 02/10/2020 06:19 AM    MCV 88.6 02/10/2020 06:19 AM    MCH 29.9 02/10/2020 06:19 AM    MCHC 33.8 02/10/2020 06:19 AM    RDW 43.6 02/10/2020 06:19 AM    MPV 11.3 02/10/2020 06:19 AM       Lab Results   Component Value Date/Time    SODIUM 137 02/10/2020 06:19 AM    POTASSIUM 3.6 02/10/2020 06:19 AM    CHLORIDE 103 02/10/2020 06:19 AM    CO2 27 02/10/2020 06:19 AM    ANION 7.0 02/10/2020 06:19 AM    GLUCOSE 96 02/10/2020 06:19 AM    BUN 12 02/10/2020 06:19 AM    CREATININE 0.57 02/10/2020 06:19 AM    CALCIUM 8.5 02/10/2020 06:19 AM    ASTSGOT 16 02/10/2020 06:19 AM    ALTSGPT 12 02/10/2020 06:19 AM    TBILIRUBIN 0.6 " 02/10/2020 06:19 AM    ALBUMIN 4.0 02/10/2020 06:19 AM    TOTPROTEIN 6.9 02/10/2020 06:19 AM    GLOBULIN 2.9 02/10/2020 06:19 AM    AGRATIO 1.4 02/10/2020 06:19 AM       Lab Results   Component Value Date/Time    TSHULTRASEN 1.380 08/08/2022 1419     Lab Results   Component Value Date/Time    FREET4 1.30 08/08/2022 1419     No results found for: FREET3  No results found for: THYSTIMIG      Imaging:      ASSESSMENT/PLAN:     1. Hypothyroidism, postsurgical  Stable  She is going to get the labs that was due for this visit and I will update her  Overall she has an excellent response to thyroid cancer treatment  Thyroid hormone suppressive therapy is no longer indicated  Our goal is a TSH of 0.5 - 2.0  She is meeting this goal  Continue Synthroid 112 mcg Monday to Saturday and 1/2 pill on Sunday  Follow-up in 6 months for ultrasound in the office and with repeat labs    2. Papillary carcinoma of thyroid (HCC)  Stable  She has an excellent response to thyroid cancer treatment  Continue monitoring quantitative thyroglobulin every 12 to 24 months  Schedule neck ultrasound in the office in 6 months and then every 3 to 5 years thereafter    3. Vitamin D deficiency  Stable  She is getting calcium vitamin D labs this week and I will update her  Repeat labs in 6 months      Return in about 6 months (around 8/15/2023).      This patient during there office visit today was started on a new medication.  Side effects of the new medication were discussed with the patient today in the office.     Thank you kindly for allowing me to participate in the thyroid care plan for this patient.    Mario Abbasi MD, FACE, [facility]      CC:   Shelbie Chapman M.D.

## 2023-02-22 ENCOUNTER — HOSPITAL ENCOUNTER (OUTPATIENT)
Dept: LAB | Facility: MEDICAL CENTER | Age: 38
End: 2023-02-22
Attending: INTERNAL MEDICINE
Payer: COMMERCIAL

## 2023-02-22 DIAGNOSIS — E89.0 HYPOTHYROIDISM, POSTSURGICAL: ICD-10-CM

## 2023-02-22 DIAGNOSIS — E55.9 VITAMIN D DEFICIENCY: ICD-10-CM

## 2023-02-22 DIAGNOSIS — C73 PAPILLARY CARCINOMA OF THYROID (HCC): ICD-10-CM

## 2023-02-22 LAB
ALBUMIN SERPL BCP-MCNC: 3.8 G/DL (ref 3.2–4.9)
ALBUMIN/GLOB SERPL: 1.1 G/DL
ALP SERPL-CCNC: 58 U/L (ref 30–99)
ALT SERPL-CCNC: 13 U/L (ref 2–50)
ANION GAP SERPL CALC-SCNC: 12 MMOL/L (ref 7–16)
AST SERPL-CCNC: 17 U/L (ref 12–45)
BILIRUB SERPL-MCNC: 0.2 MG/DL (ref 0.1–1.5)
BUN SERPL-MCNC: 9 MG/DL (ref 8–22)
CALCIUM ALBUM COR SERPL-MCNC: 8.2 MG/DL (ref 8.5–10.5)
CALCIUM SERPL-MCNC: 8 MG/DL (ref 8.4–10.2)
CHLORIDE SERPL-SCNC: 102 MMOL/L (ref 96–112)
CO2 SERPL-SCNC: 24 MMOL/L (ref 20–33)
CREAT SERPL-MCNC: 0.51 MG/DL (ref 0.5–1.4)
GFR SERPLBLD CREATININE-BSD FMLA CKD-EPI: 124 ML/MIN/1.73 M 2
GLOBULIN SER CALC-MCNC: 3.4 G/DL (ref 1.9–3.5)
GLUCOSE SERPL-MCNC: 79 MG/DL (ref 65–99)
POTASSIUM SERPL-SCNC: 3.5 MMOL/L (ref 3.6–5.5)
PROT SERPL-MCNC: 7.2 G/DL (ref 6–8.2)
SODIUM SERPL-SCNC: 138 MMOL/L (ref 135–145)
TSH SERPL DL<=0.005 MIU/L-ACNC: 1.05 UIU/ML (ref 0.38–5.33)

## 2023-02-22 PROCEDURE — 86800 THYROGLOBULIN ANTIBODY: CPT

## 2023-02-22 PROCEDURE — 80053 COMPREHEN METABOLIC PANEL: CPT

## 2023-02-22 PROCEDURE — 84443 ASSAY THYROID STIM HORMONE: CPT

## 2023-02-22 PROCEDURE — 36415 COLL VENOUS BLD VENIPUNCTURE: CPT

## 2023-02-22 PROCEDURE — 84432 ASSAY OF THYROGLOBULIN: CPT

## 2023-03-29 DIAGNOSIS — E89.0 HYPOTHYROIDISM, POSTSURGICAL: ICD-10-CM

## 2023-03-29 RX ORDER — LEVOTHYROXINE SODIUM 112 MCG
TABLET ORAL
Qty: 90 TABLET | Refills: 2 | Status: SHIPPED | OUTPATIENT
Start: 2023-03-29 | End: 2023-08-14 | Stop reason: SDUPTHER

## 2023-08-07 ENCOUNTER — HOSPITAL ENCOUNTER (OUTPATIENT)
Dept: LAB | Facility: MEDICAL CENTER | Age: 38
End: 2023-08-07
Attending: INTERNAL MEDICINE
Payer: COMMERCIAL

## 2023-08-07 DIAGNOSIS — E55.9 VITAMIN D DEFICIENCY: ICD-10-CM

## 2023-08-07 DIAGNOSIS — C73 PAPILLARY CARCINOMA OF THYROID (HCC): ICD-10-CM

## 2023-08-07 DIAGNOSIS — E89.0 HYPOTHYROIDISM, POSTSURGICAL: ICD-10-CM

## 2023-08-07 LAB
25(OH)D3 SERPL-MCNC: 54 NG/ML (ref 30–100)
ALBUMIN SERPL BCP-MCNC: 3.9 G/DL (ref 3.2–4.9)
ALBUMIN/GLOB SERPL: 1.2 G/DL
ALP SERPL-CCNC: 64 U/L (ref 30–99)
ALT SERPL-CCNC: 10 U/L (ref 2–50)
ANION GAP SERPL CALC-SCNC: 9 MMOL/L (ref 7–16)
AST SERPL-CCNC: 15 U/L (ref 12–45)
BILIRUB SERPL-MCNC: 0.2 MG/DL (ref 0.1–1.5)
BUN SERPL-MCNC: 13 MG/DL (ref 8–22)
CALCIUM ALBUM COR SERPL-MCNC: 8.9 MG/DL (ref 8.5–10.5)
CALCIUM SERPL-MCNC: 8.8 MG/DL (ref 8.4–10.2)
CHLORIDE SERPL-SCNC: 98 MMOL/L (ref 96–112)
CO2 SERPL-SCNC: 27 MMOL/L (ref 20–33)
CREAT SERPL-MCNC: 0.48 MG/DL (ref 0.5–1.4)
GFR SERPLBLD CREATININE-BSD FMLA CKD-EPI: 126 ML/MIN/1.73 M 2
GLOBULIN SER CALC-MCNC: 3.3 G/DL (ref 1.9–3.5)
GLUCOSE SERPL-MCNC: 104 MG/DL (ref 65–99)
POTASSIUM SERPL-SCNC: 3.8 MMOL/L (ref 3.6–5.5)
PROT SERPL-MCNC: 7.2 G/DL (ref 6–8.2)
SODIUM SERPL-SCNC: 134 MMOL/L (ref 135–145)
T4 FREE SERPL-MCNC: 1.4 NG/DL (ref 0.93–1.7)
TSH SERPL DL<=0.005 MIU/L-ACNC: 1.87 UIU/ML (ref 0.38–5.33)

## 2023-08-07 PROCEDURE — 36415 COLL VENOUS BLD VENIPUNCTURE: CPT

## 2023-08-07 PROCEDURE — 82306 VITAMIN D 25 HYDROXY: CPT

## 2023-08-07 PROCEDURE — 84439 ASSAY OF FREE THYROXINE: CPT

## 2023-08-07 PROCEDURE — 80053 COMPREHEN METABOLIC PANEL: CPT

## 2023-08-07 PROCEDURE — 84443 ASSAY THYROID STIM HORMONE: CPT

## 2023-08-14 ENCOUNTER — PROCEDURE VISIT (OUTPATIENT)
Dept: ENDOCRINOLOGY | Facility: MEDICAL CENTER | Age: 38
End: 2023-08-14
Attending: INTERNAL MEDICINE
Payer: COMMERCIAL

## 2023-08-14 DIAGNOSIS — C73 PAPILLARY CARCINOMA OF THYROID (HCC): ICD-10-CM

## 2023-08-14 DIAGNOSIS — E89.0 HYPOTHYROIDISM, POSTSURGICAL: ICD-10-CM

## 2023-08-14 DIAGNOSIS — E55.9 VITAMIN D DEFICIENCY: ICD-10-CM

## 2023-08-14 DIAGNOSIS — Z78.9 USES BIRTH CONTROL: ICD-10-CM

## 2023-08-14 PROCEDURE — 76536 US EXAM OF HEAD AND NECK: CPT | Performed by: INTERNAL MEDICINE

## 2023-08-14 RX ORDER — NORGESTIMATE AND ETHINYL ESTRADIOL 0.25-0.035
1 KIT ORAL DAILY
Qty: 84 TABLET | Refills: 0 | Status: SHIPPED | OUTPATIENT
Start: 2023-08-14 | End: 2023-08-14 | Stop reason: SDUPTHER

## 2023-08-14 RX ORDER — NORGESTIMATE AND ETHINYL ESTRADIOL 0.25-0.035
1 KIT ORAL DAILY
Qty: 84 TABLET | Refills: 0 | Status: SHIPPED | OUTPATIENT
Start: 2023-08-14 | End: 2023-09-15 | Stop reason: SDUPTHER

## 2023-08-14 RX ORDER — LEVOTHYROXINE SODIUM 112 MCG
TABLET ORAL
Qty: 90 TABLET | Refills: 2 | Status: SHIPPED | OUTPATIENT
Start: 2023-08-14 | End: 2024-03-04 | Stop reason: SDUPTHER

## 2023-08-14 NOTE — PROGRESS NOTES
Neck  US done on this procedure visit  Please see dictated POC US report completed by endocrinologist  Patient advised to follow up as planned with labs prior    Mario Abbasi M.D.

## 2023-09-13 SDOH — ECONOMIC STABILITY: TRANSPORTATION INSECURITY
IN THE PAST 12 MONTHS, HAS THE LACK OF TRANSPORTATION KEPT YOU FROM MEDICAL APPOINTMENTS OR FROM GETTING MEDICATIONS?: NO

## 2023-09-13 SDOH — HEALTH STABILITY: MENTAL HEALTH
STRESS IS WHEN SOMEONE FEELS TENSE, NERVOUS, ANXIOUS, OR CAN'T SLEEP AT NIGHT BECAUSE THEIR MIND IS TROUBLED. HOW STRESSED ARE YOU?: ONLY A LITTLE

## 2023-09-13 SDOH — ECONOMIC STABILITY: HOUSING INSECURITY
IN THE LAST 12 MONTHS, WAS THERE A TIME WHEN YOU DID NOT HAVE A STEADY PLACE TO SLEEP OR SLEPT IN A SHELTER (INCLUDING NOW)?: NO

## 2023-09-13 SDOH — HEALTH STABILITY: PHYSICAL HEALTH: ON AVERAGE, HOW MANY DAYS PER WEEK DO YOU ENGAGE IN MODERATE TO STRENUOUS EXERCISE (LIKE A BRISK WALK)?: 1 DAY

## 2023-09-13 SDOH — HEALTH STABILITY: PHYSICAL HEALTH: ON AVERAGE, HOW MANY MINUTES DO YOU ENGAGE IN EXERCISE AT THIS LEVEL?: 30 MIN

## 2023-09-13 SDOH — ECONOMIC STABILITY: HOUSING INSECURITY: IN THE LAST 12 MONTHS, HOW MANY PLACES HAVE YOU LIVED?: 1

## 2023-09-13 SDOH — ECONOMIC STABILITY: FOOD INSECURITY: WITHIN THE PAST 12 MONTHS, YOU WORRIED THAT YOUR FOOD WOULD RUN OUT BEFORE YOU GOT MONEY TO BUY MORE.: NEVER TRUE

## 2023-09-13 SDOH — ECONOMIC STABILITY: TRANSPORTATION INSECURITY
IN THE PAST 12 MONTHS, HAS LACK OF TRANSPORTATION KEPT YOU FROM MEETINGS, WORK, OR FROM GETTING THINGS NEEDED FOR DAILY LIVING?: NO

## 2023-09-13 SDOH — ECONOMIC STABILITY: FOOD INSECURITY: WITHIN THE PAST 12 MONTHS, THE FOOD YOU BOUGHT JUST DIDN'T LAST AND YOU DIDN'T HAVE MONEY TO GET MORE.: NEVER TRUE

## 2023-09-13 SDOH — ECONOMIC STABILITY: INCOME INSECURITY: HOW HARD IS IT FOR YOU TO PAY FOR THE VERY BASICS LIKE FOOD, HOUSING, MEDICAL CARE, AND HEATING?: NOT VERY HARD

## 2023-09-13 SDOH — ECONOMIC STABILITY: INCOME INSECURITY: IN THE LAST 12 MONTHS, WAS THERE A TIME WHEN YOU WERE NOT ABLE TO PAY THE MORTGAGE OR RENT ON TIME?: NO

## 2023-09-13 SDOH — ECONOMIC STABILITY: TRANSPORTATION INSECURITY
IN THE PAST 12 MONTHS, HAS LACK OF RELIABLE TRANSPORTATION KEPT YOU FROM MEDICAL APPOINTMENTS, MEETINGS, WORK OR FROM GETTING THINGS NEEDED FOR DAILY LIVING?: NO

## 2023-09-13 ASSESSMENT — SOCIAL DETERMINANTS OF HEALTH (SDOH)
HOW MANY DRINKS CONTAINING ALCOHOL DO YOU HAVE ON A TYPICAL DAY WHEN YOU ARE DRINKING: 1 OR 2
HOW OFTEN DO YOU ATTENT MEETINGS OF THE CLUB OR ORGANIZATION YOU BELONG TO?: NEVER
HOW HARD IS IT FOR YOU TO PAY FOR THE VERY BASICS LIKE FOOD, HOUSING, MEDICAL CARE, AND HEATING?: NOT VERY HARD
HOW OFTEN DO YOU HAVE SIX OR MORE DRINKS ON ONE OCCASION: NEVER
DO YOU BELONG TO ANY CLUBS OR ORGANIZATIONS SUCH AS CHURCH GROUPS UNIONS, FRATERNAL OR ATHLETIC GROUPS, OR SCHOOL GROUPS?: NO
HOW OFTEN DO YOU HAVE A DRINK CONTAINING ALCOHOL: 2-4 TIMES A MONTH
HOW OFTEN DO YOU GET TOGETHER WITH FRIENDS OR RELATIVES?: ONCE A WEEK
IN A TYPICAL WEEK, HOW MANY TIMES DO YOU TALK ON THE PHONE WITH FAMILY, FRIENDS, OR NEIGHBORS?: MORE THAN THREE TIMES A WEEK
HOW OFTEN DO YOU GET TOGETHER WITH FRIENDS OR RELATIVES?: ONCE A WEEK
DO YOU BELONG TO ANY CLUBS OR ORGANIZATIONS SUCH AS CHURCH GROUPS UNIONS, FRATERNAL OR ATHLETIC GROUPS, OR SCHOOL GROUPS?: NO
IN A TYPICAL WEEK, HOW MANY TIMES DO YOU TALK ON THE PHONE WITH FAMILY, FRIENDS, OR NEIGHBORS?: MORE THAN THREE TIMES A WEEK
HOW OFTEN DO YOU ATTEND CHURCH OR RELIGIOUS SERVICES?: NEVER
HOW OFTEN DO YOU ATTENT MEETINGS OF THE CLUB OR ORGANIZATION YOU BELONG TO?: NEVER
HOW OFTEN DO YOU ATTEND CHURCH OR RELIGIOUS SERVICES?: NEVER
WITHIN THE PAST 12 MONTHS, YOU WORRIED THAT YOUR FOOD WOULD RUN OUT BEFORE YOU GOT THE MONEY TO BUY MORE: NEVER TRUE

## 2023-09-13 ASSESSMENT — LIFESTYLE VARIABLES
HOW OFTEN DO YOU HAVE SIX OR MORE DRINKS ON ONE OCCASION: NEVER
SKIP TO QUESTIONS 9-10: 1
HOW MANY STANDARD DRINKS CONTAINING ALCOHOL DO YOU HAVE ON A TYPICAL DAY: 1 OR 2
AUDIT-C TOTAL SCORE: 2
HOW OFTEN DO YOU HAVE A DRINK CONTAINING ALCOHOL: 2-4 TIMES A MONTH

## 2023-09-15 ENCOUNTER — OFFICE VISIT (OUTPATIENT)
Dept: MEDICAL GROUP | Facility: MEDICAL CENTER | Age: 38
End: 2023-09-15
Payer: COMMERCIAL

## 2023-09-15 ENCOUNTER — DOCUMENTATION (OUTPATIENT)
Dept: HEALTH INFORMATION MANAGEMENT | Facility: OTHER | Age: 38
End: 2023-09-15

## 2023-09-15 VITALS
TEMPERATURE: 98.2 F | HEART RATE: 76 BPM | SYSTOLIC BLOOD PRESSURE: 118 MMHG | HEIGHT: 65 IN | BODY MASS INDEX: 27.52 KG/M2 | OXYGEN SATURATION: 97 % | WEIGHT: 165.2 LBS | RESPIRATION RATE: 16 BRPM | DIASTOLIC BLOOD PRESSURE: 62 MMHG

## 2023-09-15 DIAGNOSIS — Z23 NEED FOR VACCINATION: ICD-10-CM

## 2023-09-15 DIAGNOSIS — Z11.59 NEED FOR HEPATITIS C SCREENING TEST: ICD-10-CM

## 2023-09-15 DIAGNOSIS — E89.0 HYPOTHYROIDISM, POSTSURGICAL: ICD-10-CM

## 2023-09-15 DIAGNOSIS — Z78.9 USES BIRTH CONTROL: ICD-10-CM

## 2023-09-15 DIAGNOSIS — Z00.00 PE (PHYSICAL EXAM), ANNUAL: ICD-10-CM

## 2023-09-15 PROCEDURE — 99214 OFFICE O/P EST MOD 30 MIN: CPT | Performed by: NURSE PRACTITIONER

## 2023-09-15 PROCEDURE — 3078F DIAST BP <80 MM HG: CPT | Performed by: NURSE PRACTITIONER

## 2023-09-15 PROCEDURE — 3074F SYST BP LT 130 MM HG: CPT | Performed by: NURSE PRACTITIONER

## 2023-09-15 RX ORDER — NORGESTIMATE AND ETHINYL ESTRADIOL 0.25-0.035
1 KIT ORAL DAILY
Qty: 84 TABLET | Refills: 3 | Status: SHIPPED | OUTPATIENT
Start: 2023-09-15

## 2023-09-15 NOTE — ASSESSMENT & PLAN NOTE
New to me. Hx of thyroid ca in 2012, total thyrodectomy.   On Synthroid 112 mcg.   Denies hypo or hyperthyroid s/s

## 2023-09-15 NOTE — PROGRESS NOTES
Chief Complaint   Patient presents with    Establish Care       HISTORY OF PRESENT ILLNESS: Patient is a 38 y.o. female, established patient who presents today to discuss medical problems as listed below:    Health Maintenance:  COMPLETED       Allergies: Patient has no known allergies.    Current Outpatient Medications   Medication Sig Dispense Refill    norgestimate-ethinyl estradiol (ORTHO-CYCLEN) 0.25-35 MG-MCG per tablet Take 1 Tablet by mouth every day. One time refill 84 Tablet 3    SYNTHROID 112 MCG Tab TAKE 1 TABLET EVERY MORNING ON AN EMPTY STOMACH FOR POSTPROCEDURAL HYPOTHYROIDISM 90 Tablet 2    Cholecalciferol (VITAMIN D) 2000 UNIT Tab Take  by mouth every day.      Multiple Vitamins-Minerals (MULTIVITAMIN GUMMIES ADULT PO) Take  by mouth.       No current facility-administered medications for this visit.       Allergies, past medical history, past surgical history, family history, social history reviewed and updated.    Review of Systems:     - Constitutional: Negative for fever, chills, unexpected weight change, and fatigue/generalized weakness.     - HEENT: Negative for headaches, vision changes, hearing changes, ear pain, ear discharge, rhinorrhea, sinus congestion, sore throat, and neck pain.      - Respiratory: Negative for cough, sputum production, chest congestion, dyspnea, wheezing, and crackles.      - Cardiovascular: Negative for chest pain, palpitations, orthopnea, and bilateral lower extremity edema.     - Gastrointestinal: Negative for heartburn, nausea, vomiting, abdominal pain, hematochezia, melena, diarrhea, constipation, and greasy/foul-smelling stools.     - Genitourinary: Negative for dysuria, polyuria, hematuria, pyuria, urinary urgency, and urinary incontinence.    - Musculoskeletal: Negative for myalgias, back pain, and joint pain.     - Skin: Negative for rash, itching, cyanotic skin color change.     - Neurological: Negative for dizziness, tingling, tremors, focal sensory  "deficit, focal weakness and headaches.     - Endo/Heme/Allergies: Does not bruise/bleed easily.     - Psychiatric/Behavioral: Negative for depression, suicidal/homicidal ideation and memory loss.      All other systems reviewed and are negative    Exam:    /62   Pulse 76   Temp 36.8 °C (98.2 °F) (Temporal)   Resp 16   Ht 1.651 m (5' 5\")   Wt 74.9 kg (165 lb 3.2 oz)   SpO2 97%   BMI 27.49 kg/m²  Body mass index is 27.49 kg/m².    Physical Exam:  Constitutional: Well-developed and well-nourished. Not diaphoretic. No distress.   Skin: Skin is warm and dry. No rash noted.  Head: Atraumatic without lesions.  Eyes: Conjunctivae and extraocular motions are normal. Pupils are equal, round, and reactive to light. No scleral icterus.   Ears:  External ears unremarkable. Tympanic membranes clear and intact.  Nose: Nares patent. Septum midline. Turbinates without erythema nor edema. No discharge.   Mouth/Throat: Dentition is normal. Tongue normal. Oropharynx is clear and moist. Posterior pharynx without erythema or exudates.  Neck: Supple, trachea midline. Normal range of motion. No thyromegaly present. No lymphadenopathy--cervical or supraclavicular.  Cardiovascular: Regular rate and rhythm, S1 and S2 without murmur, rubs, or gallops.    Chest: Effort normal. Clear to auscultation throughout. No adventitious sounds. No CVA tenderness.  Abdomen: Soft, non tender, and without distention. Active bowel sounds in all four quadrants. No rebound, guarding, masses or HSM.  : Negative for dysuria, polyuria, hematuria, pyuria, urinary urgency, and urinary incontinence.  Extremities: No cyanosis, clubbing, erythema, nor edema. Distal pulses intact and symmetric.   Musculoskeletal: All major joints AROM full in all directions without pain.  Neurological: Alert and oriented x 3. DTRs 2+/3 and symmetric. No cranial nerve deficit. 5/5 myotomes. Sensation intact. Negative Rhomberg.  Psychiatric:  Behavior, mood, and affect are " appropriate.  MA/nursing note and vitals reviewed.    LABS: 8/2023  results reviewed and discussed with the patient, questions answered.    Assessment/Plan:  Hypothyroidism, postsurgical  New to me. Hx of thyroid ca in 2012, total thyrodectomy.   On Synthroid 112 mcg.   Denies hypo or hyperthyroid s/s    Uses birth control  New to me. On OCP, needing refills today, no interruption in use.     1. Hypothyroidism, postsurgical  Stable, followed by endo    2. Uses birth control  - norgestimate-ethinyl estradiol (ORTHO-CYCLEN) 0.25-35 MG-MCG per tablet; Take 1 Tablet by mouth every day. One time refill  Dispense: 84 Tablet; Refill: 3    4. PE (physical exam), annual  - CBC WITHOUT DIFFERENTIAL; Future  - Comp Metabolic Panel; Future  - Lipid Profile; Future  - HEMOGLOBIN A1C; Future  - VITAMIN D,25 HYDROXY (DEFICIENCY); Future  - TSH; Future  - T3 FREE; Future  - FREE THYROXINE; Future    5. Need for hepatitis C screening test  - HEP C VIRUS ANTIBODY; Future      Discussed with patient possible alternative diagnoses, patient is to take all medications as prescribed.      If symptoms persist FU w/PCP, if symptoms worsen go to emergency room.      If experiencing any side effects from prescribed medications report to the office immediately or go to emergency room.     Reviewed indication, dosage, usage and potential adverse effects of prescribed medications.      Reviewed risks and benefits of treatment plan. Patient verbalizes understanding of all instruction and verbally agrees to plan.     Discussed plan with the patient, and patient agrees to the above.      I personally reviewed prior external notes and test results pertinent to today's visit.      No follow-ups on file. 6 mos

## 2024-03-04 ENCOUNTER — TELEMEDICINE (OUTPATIENT)
Dept: ENDOCRINOLOGY | Facility: MEDICAL CENTER | Age: 39
End: 2024-03-04
Attending: INTERNAL MEDICINE
Payer: COMMERCIAL

## 2024-03-04 ENCOUNTER — HOSPITAL ENCOUNTER (OUTPATIENT)
Dept: LAB | Facility: MEDICAL CENTER | Age: 39
End: 2024-03-04
Attending: INTERNAL MEDICINE
Payer: COMMERCIAL

## 2024-03-04 DIAGNOSIS — E83.51 HYPOCALCEMIA: ICD-10-CM

## 2024-03-04 DIAGNOSIS — E55.9 VITAMIN D DEFICIENCY: ICD-10-CM

## 2024-03-04 DIAGNOSIS — E89.0 HYPOTHYROIDISM, POSTSURGICAL: ICD-10-CM

## 2024-03-04 DIAGNOSIS — C73 PAPILLARY CARCINOMA OF THYROID (HCC): ICD-10-CM

## 2024-03-04 LAB
25(OH)D3 SERPL-MCNC: 43 NG/ML (ref 30–100)
ALBUMIN SERPL BCP-MCNC: 4 G/DL (ref 3.2–4.9)
ALBUMIN/GLOB SERPL: 1.2 G/DL
ALP SERPL-CCNC: 58 U/L (ref 30–99)
ALT SERPL-CCNC: 13 U/L (ref 2–50)
ANION GAP SERPL CALC-SCNC: 11 MMOL/L (ref 7–16)
AST SERPL-CCNC: 18 U/L (ref 12–45)
BILIRUB SERPL-MCNC: 0.4 MG/DL (ref 0.1–1.5)
BUN SERPL-MCNC: 10 MG/DL (ref 8–22)
CALCIUM ALBUM COR SERPL-MCNC: 7.7 MG/DL (ref 8.5–10.5)
CALCIUM SERPL-MCNC: 7.7 MG/DL (ref 8.5–10.5)
CHLORIDE SERPL-SCNC: 100 MMOL/L (ref 96–112)
CO2 SERPL-SCNC: 25 MMOL/L (ref 20–33)
CREAT SERPL-MCNC: 0.49 MG/DL (ref 0.5–1.4)
GFR SERPLBLD CREATININE-BSD FMLA CKD-EPI: 125 ML/MIN/1.73 M 2
GLOBULIN SER CALC-MCNC: 3.3 G/DL (ref 1.9–3.5)
GLUCOSE SERPL-MCNC: 95 MG/DL (ref 65–99)
POTASSIUM SERPL-SCNC: 4 MMOL/L (ref 3.6–5.5)
PROT SERPL-MCNC: 7.3 G/DL (ref 6–8.2)
SODIUM SERPL-SCNC: 136 MMOL/L (ref 135–145)
T4 FREE SERPL-MCNC: 1.26 NG/DL (ref 0.93–1.7)
TSH SERPL DL<=0.005 MIU/L-ACNC: 1.69 UIU/ML (ref 0.38–5.33)

## 2024-03-04 PROCEDURE — 86800 THYROGLOBULIN ANTIBODY: CPT

## 2024-03-04 PROCEDURE — 84439 ASSAY OF FREE THYROXINE: CPT

## 2024-03-04 PROCEDURE — 99214 OFFICE O/P EST MOD 30 MIN: CPT | Mod: 95 | Performed by: INTERNAL MEDICINE

## 2024-03-04 PROCEDURE — 84432 ASSAY OF THYROGLOBULIN: CPT

## 2024-03-04 PROCEDURE — 82306 VITAMIN D 25 HYDROXY: CPT

## 2024-03-04 PROCEDURE — 84443 ASSAY THYROID STIM HORMONE: CPT

## 2024-03-04 PROCEDURE — 36415 COLL VENOUS BLD VENIPUNCTURE: CPT

## 2024-03-04 PROCEDURE — 80053 COMPREHEN METABOLIC PANEL: CPT

## 2024-03-04 RX ORDER — LEVOTHYROXINE SODIUM 112 MCG
TABLET ORAL
Qty: 90 TABLET | Refills: 2 | Status: SHIPPED | OUTPATIENT
Start: 2024-03-04

## 2024-03-04 NOTE — PROGRESS NOTES
CHIEF COMPLAINT: Followup of postsurgical hypothyroidism and papillary thyroid carcinoma.  Patient was presented for a telehealth consultation via secure and encrypted videoconferencing technology.   This encounter was conducted via Zoom . Verbal consent was obtained. Patient's identity was verified.    Virtual visit consent       This evaluation was conducted via Zoom using secure and encrypted videoconferencing technology.   The patient was (home or other private:[id number]) in the [facility].   The patient's identity was confirmed and verbal consent was obtained for this virtual visit.         HPI:   Shreya White is a 38 y.o. female, who had initial total thyroidectomy on 2011 at [facility] with pathology revealing papillary thyroid carcinoma 3.4 cm with positive LN 4 out 7 with the largest metastatic focus of 6mm.   She did not have problems with postoperative hypocalcemia. She was subsequently treated with radioactive iodine  156 millicuries of I-131 at [facility].  Thyrogen stimulation studies were not done.   Neck ultrasound was last done on 7/2017 showing no evidence of recurrence.      Her unstimulated thyroglobulin was less than 0.1 with negative interfering antibodies on   2/2023    Her unstimulated thyroglobulin level from recent labs in 2024 are still in process and results are pending.     Neck US was done on 8/2023 and this showed no evidence of cancer recurrence      Since last visit, she has remained on Synthroid 112mcg daily except 1/2 pill on Sunday has been her dose since  24  months.  She is feeling well.  Energy level has been excellent.  Weight is Stable.  No palpitations, no frequent diarrhea, or frequent constipation.  No heat or cold intolerance.  No anterior neck symptoms or problems.  No voice changes.       Latest Reference Range & Units 03/04/24 07:03   25-Hydroxy   Vitamin D 25 30 - 100 ng/mL 43   TSH 0.380 - 5.330 uIU/mL 1.690   Free T-4 0.93 - 1.70 ng/dL 1.26      Latest  Reference Range & Units 03/04/24 07:03   Sodium 135 - 145 mmol/L 136   Potassium 3.6 - 5.5 mmol/L 4.0   Chloride 96 - 112 mmol/L 100   Co2 20 - 33 mmol/L 25   Anion Gap 7.0 - 16.0  11.0   Glucose 65 - 99 mg/dL 95   Bun 8 - 22 mg/dL 10   Creatinine 0.50 - 1.40 mg/dL 0.49 (L)   GFR (CKD-EPI) >60 mL/min/1.73 m 2 125   Calcium 8.5 - 10.5 mg/dL 7.7 (L)   Correct Calcium 8.5 - 10.5 mg/dL 7.7 (L)         Patient's medications, allergies, and social histories were reviewed and updated as appropriate.      ROS:      CONS:     No fever, no chills   EYES:     No diplopia, no blurry vision   CV:           No chest pain, no palpitations   PULM:     No SOB, no cough, no hemoptysis.   GI:            No nausea, no vomiting, no diarrhea, no constipation   ENDO:     No polyuria, no polydipsia, no heat intolerance, no cold intolerance       Past Medical History:  Problem List:  2023-09: Uses birth control  2018-01: Obesity (BMI 30-39.9)  2017-03: Nexplanon in place  2017-01: Vitamin D deficiency  2016-11: Family planning  2015-06: Hypothyroidism, postsurgical  Prediabetes  Papillary carcinoma of thyroid (HCC)      Past Surgical History:  Past Surgical History:   Procedure Laterality Date    THYROIDECTOMY TOTAL      3/24/2012        Allergies:  Patient has no known allergies.     Social History:  Social History     Tobacco Use    Smoking status: Never    Smokeless tobacco: Never   Vaping Use    Vaping Use: Never used   Substance Use Topics    Alcohol use: Yes     Alcohol/week: 0.0 oz     Comment: rare    Drug use: No        Family History:   family history is not on file.      PHYSICAL EXAM:   Vital signs: There were no vitals taken for this visit.  GENERAL: Well-developed, well-nourished in no apparent distress.   EYE:  No ocular asymmetry, PERRLA  HENT: Pink, moist mucous membranes.    NECK: Well healed transverse scar, Thyroid is surgically absent   CARDIOVASCULAR:  No murmurs  LUNGS: Clear breath sounds  ABDOMEN: Soft, nontender    EXTREMITIES: No clubbing, cyanosis, or edema.   NEUROLOGICAL: No gross focal motor abnormalities   LYMPH: No cervical adenopathy seen  SKIN: No rashes, lesions.       Labs:  Lab Results   Component Value Date/Time    WBC 5.9 02/10/2020 06:19 AM    RBC 4.48 02/10/2020 06:19 AM    HEMOGLOBIN 13.4 02/10/2020 06:19 AM    MCV 88.6 02/10/2020 06:19 AM    MCH 29.9 02/10/2020 06:19 AM    MCHC 33.8 02/10/2020 06:19 AM    RDW 43.6 02/10/2020 06:19 AM    MPV 11.3 02/10/2020 06:19 AM       Lab Results   Component Value Date/Time    SODIUM 136 03/04/2024 07:03 AM    POTASSIUM 4.0 03/04/2024 07:03 AM    CHLORIDE 100 03/04/2024 07:03 AM    CO2 25 03/04/2024 07:03 AM    ANION 11.0 03/04/2024 07:03 AM    GLUCOSE 95 03/04/2024 07:03 AM    BUN 10 03/04/2024 07:03 AM    CREATININE 0.49 (L) 03/04/2024 07:03 AM    CALCIUM 7.7 (L) 03/04/2024 07:03 AM    ASTSGOT 18 03/04/2024 07:03 AM    ALTSGPT 13 03/04/2024 07:03 AM    TBILIRUBIN 0.4 03/04/2024 07:03 AM    ALBUMIN 4.0 03/04/2024 07:03 AM    TOTPROTEIN 7.3 03/04/2024 07:03 AM    GLOBULIN 3.3 03/04/2024 07:03 AM    AGRATIO 1.2 03/04/2024 07:03 AM       Lab Results   Component Value Date/Time    TSHULTRASEN 1.380 08/08/2022 1419     Lab Results   Component Value Date/Time    FREET4 1.30 08/08/2022 1419     No results found for: FREET3  No results found for: THYSTIMIG      Imaging:      ASSESSMENT/PLAN:     1. Hypothyroidism, postsurgical  Stable  Overall she has an excellent response to thyroid cancer treatment  Thyroid hormone suppressive therapy is no longer indicated  Our goal is a TSH of 0.5 - 2.0  She is meeting this goal  Continue Synthroid 112 mcg Monday to Saturday and 1/2 pill on Sunday  Follow-up in 6 months for ultrasound in the office and with repeat labs    2. Papillary carcinoma of thyroid (HCC)  Stable  She has an excellent response to thyroid cancer treatment  Continue monitoring quantitative thyroglobulin every 12 to 24 months  Schedule neck ultrasound in the office in 6  months and then every 3 to 5 years thereafter    3. Vitamin D deficiency  Stable  She is getting calcium vitamin D labs this week and I will update her  Repeat labs in 6 months      Return in about 6 months (around 9/4/2024).      Thank you kindly for allowing me to participate in the thyroid care plan for this patient.    Mario Abbasi MD, FACE, [facility]      CC:   ILYA Logan.

## 2024-03-06 LAB
THYROGLOB AB SERPL-ACNC: 1.2 IU/ML (ref 0–4)
THYROGLOB SERPL-MCNC: <0.1 NG/ML (ref 1.3–31.8)
THYROGLOB SERPL-MCNC: ABNORMAL NG/ML (ref 1.3–31.8)

## 2024-04-04 ENCOUNTER — OFFICE VISIT (OUTPATIENT)
Dept: URGENT CARE | Facility: CLINIC | Age: 39
End: 2024-04-04
Payer: COMMERCIAL

## 2024-04-04 VITALS
RESPIRATION RATE: 16 BRPM | WEIGHT: 165 LBS | TEMPERATURE: 98.2 F | BODY MASS INDEX: 27.49 KG/M2 | DIASTOLIC BLOOD PRESSURE: 78 MMHG | HEART RATE: 78 BPM | OXYGEN SATURATION: 99 % | HEIGHT: 65 IN | SYSTOLIC BLOOD PRESSURE: 122 MMHG

## 2024-04-04 DIAGNOSIS — J30.9 ALLERGIC RHINITIS, UNSPECIFIED SEASONALITY, UNSPECIFIED TRIGGER: ICD-10-CM

## 2024-04-04 DIAGNOSIS — R68.89 FLU-LIKE SYMPTOMS: ICD-10-CM

## 2024-04-04 DIAGNOSIS — J02.9 SORE THROAT: ICD-10-CM

## 2024-04-04 LAB
FLUAV RNA SPEC QL NAA+PROBE: NEGATIVE
FLUBV RNA SPEC QL NAA+PROBE: NEGATIVE
RSV RNA SPEC QL NAA+PROBE: NEGATIVE
S PYO DNA SPEC NAA+PROBE: NOT DETECTED
SARS-COV-2 RNA RESP QL NAA+PROBE: NEGATIVE

## 2024-04-04 PROCEDURE — 87651 STREP A DNA AMP PROBE: CPT | Performed by: PHYSICIAN ASSISTANT

## 2024-04-04 PROCEDURE — 99213 OFFICE O/P EST LOW 20 MIN: CPT | Performed by: PHYSICIAN ASSISTANT

## 2024-04-04 PROCEDURE — 3074F SYST BP LT 130 MM HG: CPT | Performed by: PHYSICIAN ASSISTANT

## 2024-04-04 PROCEDURE — 0241U POCT CEPHEID COV-2, FLU A/B, RSV - PCR: CPT | Performed by: PHYSICIAN ASSISTANT

## 2024-04-04 PROCEDURE — 3078F DIAST BP <80 MM HG: CPT | Performed by: PHYSICIAN ASSISTANT

## 2024-04-04 ASSESSMENT — ENCOUNTER SYMPTOMS
COUGH: 0
SORE THROAT: 1
FEVER: 1
CHILLS: 1

## 2024-04-05 NOTE — PROGRESS NOTES
Subjective:   Shreya White is a 38 y.o. female who presents today with   Chief Complaint   Patient presents with    Cold Symptoms     X6 days, swollen tonsils, runny nose, and chills      Other  This is a new problem. The current episode started in the past 7 days. The problem occurs constantly. The problem has been unchanged. Associated symptoms include chills (resolved), congestion, a fever (resolved) and a sore throat. Pertinent negatives include no coughing.     Patient states she is having more flulike symptoms over the weekend and fever and chills that have since resolved.    PMH:  has a past medical history of Nexplanon in place (3/1/2017), Papillary carcinoma of thyroid (HCC) (2012), Papillary carcinoma of thyroid (HCC) (2012), Postsurgical hypothyroidism (2012), and Prediabetes.    She has no past medical history of Encounter for long-term (current) use of other medications.  MEDS:   Current Outpatient Medications:     SYNTHROID 112 MCG Tab, TAKE 1 TABLET EVERY MORNING ON AN EMPTY STOMACH FOR POSTPROCEDURAL HYPOTHYROIDISM, Disp: 90 Tablet, Rfl: 2    norgestimate-ethinyl estradiol (ORTHO-CYCLEN) 0.25-35 MG-MCG per tablet, Take 1 Tablet by mouth every day. One time refill, Disp: 84 Tablet, Rfl: 3    Cholecalciferol (VITAMIN D) 2000 UNIT Tab, Take  by mouth every day., Disp: , Rfl:     Multiple Vitamins-Minerals (MULTIVITAMIN GUMMIES ADULT PO), Take  by mouth., Disp: , Rfl:   ALLERGIES: No Known Allergies  SURGHX:   Past Surgical History:   Procedure Laterality Date    THYROIDECTOMY TOTAL      3/24/2012     SOCHX:  reports that she has never smoked. She has never used smokeless tobacco. She reports current alcohol use. She reports that she does not use drugs.  FH: Reviewed with patient, not pertinent to this visit.     Review of Systems   Constitutional:  Positive for chills (resolved) and fever (resolved).   HENT:  Positive for congestion and sore throat.    Respiratory:  Negative for cough.   "     Objective:   /78   Pulse 78   Temp 36.8 °C (98.2 °F) (Temporal)   Resp 16   Ht 1.651 m (5' 5\")   Wt 74.8 kg (165 lb)   SpO2 99%   BMI 27.46 kg/m²   Physical Exam  Vitals and nursing note reviewed.   Constitutional:       General: She is not in acute distress.     Appearance: Normal appearance. She is well-developed. She is not ill-appearing or toxic-appearing.   HENT:      Head: Normocephalic and atraumatic.      Right Ear: Hearing, tympanic membrane and ear canal normal.      Left Ear: Hearing, tympanic membrane and ear canal normal.      Nose: Congestion present.      Mouth/Throat:      Mouth: Mucous membranes are moist.      Pharynx: Uvula midline. Posterior oropharyngeal erythema present. No uvula swelling.      Tonsils: No tonsillar exudate or tonsillar abscesses.      Comments: Postnasal drainage  Cardiovascular:      Rate and Rhythm: Normal rate and regular rhythm.      Heart sounds: Normal heart sounds.   Pulmonary:      Effort: Pulmonary effort is normal. No respiratory distress.      Breath sounds: Normal breath sounds. No stridor. No wheezing, rhonchi or rales.   Musculoskeletal:      Comments: Normal movement in all 4 extremities   Skin:     General: Skin is warm and dry.   Neurological:      Mental Status: She is alert.      Coordination: Coordination normal.   Psychiatric:         Mood and Affect: Mood normal.       STREP A -    COVID -  FLU -  RSV -    Assessment/Plan:   Assessment    1. Allergic rhinitis, unspecified seasonality, unspecified trigger    2. Sore throat  - POCT GROUP A STREP, PCR    3. Flu-like symptoms  - POCT CoV-2, Flu A/B, RSV by PCR    Symptoms and presentation appear consistent with viral illness versus nasal congestion/postnasal drainage from likely allergic rhinitis.  Recommend continued use of Claritin and also adding on Flonase per 's instructions to help alleviate symptoms.    Differential diagnosis, natural history, supportive care, and " indications for immediate follow-up discussed.   Patient given instructions and understanding of medications and treatment.    If not improving in 3-5 days, F/U with PCP or return to UC if symptoms worsen.    Patient agreeable to plan.        Please note that this dictation was created using voice recognition software. I have made every reasonable attempt to correct obvious errors, but I expect that there are errors of grammar and possibly content that I did not discover before finalizing the note.    Byron Loaiza PA-C

## 2024-05-08 ENCOUNTER — OFFICE VISIT (OUTPATIENT)
Dept: MEDICAL GROUP | Facility: MEDICAL CENTER | Age: 39
End: 2024-05-08
Payer: COMMERCIAL

## 2024-05-08 VITALS
OXYGEN SATURATION: 98 % | DIASTOLIC BLOOD PRESSURE: 70 MMHG | RESPIRATION RATE: 16 BRPM | SYSTOLIC BLOOD PRESSURE: 124 MMHG | WEIGHT: 171.54 LBS | TEMPERATURE: 97.4 F | BODY MASS INDEX: 28.58 KG/M2 | HEART RATE: 84 BPM | HEIGHT: 65 IN

## 2024-05-08 DIAGNOSIS — G43.009 MIGRAINE WITHOUT AURA AND WITHOUT STATUS MIGRAINOSUS, NOT INTRACTABLE: ICD-10-CM

## 2024-05-08 DIAGNOSIS — Z78.9 USES BIRTH CONTROL: ICD-10-CM

## 2024-05-08 DIAGNOSIS — M62.89 MUSCLE TIGHTNESS: ICD-10-CM

## 2024-05-08 DIAGNOSIS — G44.209 MUSCLE TENSION HEADACHE: ICD-10-CM

## 2024-05-08 PROCEDURE — 99214 OFFICE O/P EST MOD 30 MIN: CPT | Performed by: NURSE PRACTITIONER

## 2024-05-08 PROCEDURE — 3074F SYST BP LT 130 MM HG: CPT | Performed by: NURSE PRACTITIONER

## 2024-05-08 PROCEDURE — 3078F DIAST BP <80 MM HG: CPT | Performed by: NURSE PRACTITIONER

## 2024-05-08 RX ORDER — METHOCARBAMOL 750 MG/1
750 TABLET, FILM COATED ORAL
Qty: 21 TABLET | Refills: 0 | Status: SHIPPED | OUTPATIENT
Start: 2024-05-08

## 2024-05-08 RX ORDER — NORGESTIMATE AND ETHINYL ESTRADIOL 0.25-0.035
1 KIT ORAL DAILY
Qty: 84 TABLET | Refills: 3 | Status: SHIPPED | OUTPATIENT
Start: 2024-05-08

## 2024-05-08 RX ORDER — SUMATRIPTAN 50 MG/1
50 TABLET, FILM COATED ORAL
Qty: 10 TABLET | Refills: 0 | Status: SHIPPED | OUTPATIENT
Start: 2024-05-08

## 2024-05-08 ASSESSMENT — PATIENT HEALTH QUESTIONNAIRE - PHQ9: CLINICAL INTERPRETATION OF PHQ2 SCORE: 0

## 2024-05-08 ASSESSMENT — ENCOUNTER SYMPTOMS
CHILLS: 0
HEADACHES: 1
FEVER: 0
PALPITATIONS: 0

## 2024-05-08 NOTE — LETTER
May 8, 2024       Patient: Shreya White   YOB: 1985   Date of Visit: 5/8/2024         To Whom It May Concern:    Shreya White was see and examined today. Please excuse from work for today.   She can  return to full duty, no restrictions if feeling well.    If you have any questions or concerns, please don't hesitate to call [phone]          Sincerely,          ILYA Logan.  Electronically Signed

## 2024-05-08 NOTE — PROGRESS NOTES
Patient agreed to use of TAMIE: yes  Chief Complaint   Patient presents with    Headache     Sx more often than usual        HISTORY OF PRESENT ILLNESS: Patient is a pleasant 38 y.o. female, established patient who presents today to discuss medical problems as listed below:    Assessment/Plan:    Shreya was seen today for headache.    Diagnoses and all orders for this visit:    Uses birth control  -     norgestimate-ethinyl estradiol (ORTHO-CYCLEN) 0.25-35 MG-MCG per tablet; Take 1 Tablet by mouth every day. One time refill    Migraine without aura and without status migrainosus, not intractable  -     SUMAtriptan (IMITREX) 50 MG Tab; Take 1 Tablet by mouth one time as needed for Migraine (may repeat in 2 hrs, max daily dose 200 mg/day) for up to 1 dose.  -     Referral to Chiropractic    Muscle tightness  -     methocarbamol (ROBAXIN) 750 MG Tab; Take 1 Tablet by mouth 1 time a day as needed (muscle tension).  -     Referral to Chiropractic    Muscle tension headache  -     methocarbamol (ROBAXIN) 750 MG Tab; Take 1 Tablet by mouth 1 time a day as needed (muscle tension).  -     Referral to Chiropractic              Assessment & Plan  1. Contraceptive management.  The patient has demonstrated stability with the use of Ortho-Cyclen OCP, she requires refills today. No interruption in the current regimen, refills have been provided.    2. Migraines without aura.  3. Muscle tension headache  The patient's condition is chronic and stable, albeit uncontrolled, likely 2/2 muscle tightness. A trial of sumatriptan has been initiated and a follow-up appointment will be scheduled.    4. Muscle tightness.  This is a chronic and stable issue for the patient. A trial of Robaxin has been initiated. The patient has been referred to a chiropractor.    Follow-up  A follow-up appointment will be scheduled during the next visit.    History of Present Illness  The patient is a 38-year-old female who is here for a few concerns to address  "headaches, muscle tightness, and needs refill on birth control.    The patient has a history of chronic headaches, which have recently intensified, occurring less than 15 times per month, coinciding with her work environment. She denies any associated aura or nausea. The patient suspects she may have allergies, despite maintaining adequate hydration. She denies any stress or sleep disturbances and reports muscle tightness in her arms, back, and neck. On a busy week, she experiences headaches during the weekends. Over-the-counter medications such as Advil, Tylenol, and Excedrin provide occasional relief. She is seeking a new job and requests a note for her employer.    Supplemental Information  She is being followed up by Dr. Baez for her thyroid.    Health Maintenance:  COMPLETED     Allergies, past medical history, past surgical history, family history, social history reviewed and updated.    Review of Systems   Constitutional:  Negative for chills, fever and malaise/fatigue.   Cardiovascular:  Negative for chest pain, palpitations and leg swelling.   Musculoskeletal:         Muscle tightness   Neurological:  Positive for headaches.        Exam:    /70 (BP Location: Left arm, Patient Position: Sitting, BP Cuff Size: Adult)   Pulse 84   Temp 36.3 °C (97.4 °F) (Temporal)   Resp 16   Ht 1.651 m (5' 5\")   Wt 77.8 kg (171 lb 8.6 oz)   SpO2 98%   BMI 28.55 kg/m²  Body mass index is 28.55 kg/m².    Physical Exam  Constitutional:       General: She is not in acute distress.     Appearance: She is not ill-appearing.   HENT:      Head: Normocephalic and atraumatic.      Nose: Nose normal.   Eyes:      General:         Right eye: No discharge.         Left eye: No discharge.   Cardiovascular:      Rate and Rhythm: Normal rate.      Pulses: Normal pulses.      Heart sounds: Normal heart sounds. No murmur heard.  Pulmonary:      Effort: Pulmonary effort is normal. No respiratory distress.      Breath sounds: " Normal breath sounds. No stridor. No wheezing or rales.   Skin:     Findings: No rash.   Neurological:      General: No focal deficit present.      Mental Status: She is alert. Mental status is at baseline.   Psychiatric:         Mood and Affect: Mood normal.         Behavior: Behavior normal.          Results      Discussed with patient possible alternative diagnoses, patient is to take all medications as prescribed.      If symptoms persist FU w/PCP, if symptoms worsen go to emergency room.      If experiencing any side effects from prescribed medications report to the office immediately or go to emergency room.     Reviewed indication, dosage, usage and potential adverse effects of prescribed medications.      Reviewed risks and benefits of treatment plan. Patient verbalizes understanding of all instruction and verbally agrees to plan.     Discussed plan with the patient, and patient agrees to the above.      I personally reviewed prior external notes and test results pertinent to today's visit.      No follow-ups on file. 4-6 wks

## 2024-06-19 ENCOUNTER — TELEMEDICINE (OUTPATIENT)
Dept: MEDICAL GROUP | Facility: MEDICAL CENTER | Age: 39
End: 2024-06-19
Payer: COMMERCIAL

## 2024-06-19 VITALS — WEIGHT: 171 LBS | BODY MASS INDEX: 28.49 KG/M2 | HEIGHT: 65 IN

## 2024-06-19 DIAGNOSIS — G44.209 MUSCLE TENSION HEADACHE: ICD-10-CM

## 2024-06-19 DIAGNOSIS — G43.009 MIGRAINE WITHOUT AURA AND WITHOUT STATUS MIGRAINOSUS, NOT INTRACTABLE: ICD-10-CM

## 2024-06-19 DIAGNOSIS — Z78.9 USES BIRTH CONTROL: ICD-10-CM

## 2024-06-19 DIAGNOSIS — E89.0 HYPOTHYROIDISM, POSTSURGICAL: ICD-10-CM

## 2024-06-19 DIAGNOSIS — M62.89 MUSCLE TIGHTNESS: ICD-10-CM

## 2024-06-19 PROCEDURE — 99214 OFFICE O/P EST MOD 30 MIN: CPT | Mod: 95 | Performed by: NURSE PRACTITIONER

## 2024-06-19 ASSESSMENT — ENCOUNTER SYMPTOMS
FEVER: 0
CHILLS: 0
PALPITATIONS: 0

## 2024-06-19 NOTE — PROGRESS NOTES
Virtual Visit: Established Patient   This visit was conducted via Zoom using secure and encrypted videoconferencing technology.   The patient was in their home in the [facility].    The patient's identity was confirmed and verbal consent was obtained for this virtual visit.   This evaluation was conducted via Zoom using secure and encrypted videoconferencing technology. The patient was in their home in the [facility].    The patient's identity was confirmed and verbal consent was obtained for this virtual visit.     Patient agreed to using TAMIE: yes    Shreya was seen today for follow-up and muscle pain.    Diagnoses and all orders for this visit:    Migraine without aura and without status migrainosus, not intractable    Muscle tension headache    Uses birth control    Muscle tightness    Hypothyroidism, postsurgical              Assessment & Plan  1. Migraines.  The condition is chronic and stable. The patient is currently on sumatriptan on an as-needed basis. No refills are required at this time.    2. Muscle tightness in the neck.  The muscle tightness in the neck is a chronic, intermittent issue. The condition is well-managed with methocarbamol on an as-needed basis.    3. Hypothyroidism.  The condition is chronic, stable. The patient is currently on Synthroid, and no refills are required at this time.    Subjective:   CC:   Chief Complaint   Patient presents with    Follow-Up    Muscle Pain     Tension        History of Present Illness  The patient presents via virtual visit for follow-up.    The patient attempted to manage her migraines with sumatriptan 50 mg once, which proved effective. Recently, her headaches have been well-managed. She does not require a refill at this time.    The patient previously reported experiencing neck tightness, which she initially attributed to her migraines. She found relief after taking methocarbamol 750 mg for several days post her last appointment. She denies any  "associated drowsiness. She has sought chiropractic treatment from Dr. Pires, which she found beneficial.       Review of Systems   Constitutional:  Negative for chills, fever and malaise/fatigue.   Cardiovascular:  Negative for chest pain, palpitations and leg swelling.        Current medicines (including changes today)  Current Outpatient Medications   Medication Sig Dispense Refill    norgestimate-ethinyl estradiol (ORTHO-CYCLEN) 0.25-35 MG-MCG per tablet Take 1 Tablet by mouth every day. One time refill 84 Tablet 3    SUMAtriptan (IMITREX) 50 MG Tab Take 1 Tablet by mouth one time as needed for Migraine (may repeat in 2 hrs, max daily dose 200 mg/day) for up to 1 dose. 10 Tablet 0    methocarbamol (ROBAXIN) 750 MG Tab Take 1 Tablet by mouth 1 time a day as needed (muscle tension). 21 Tablet 0    SYNTHROID 112 MCG Tab TAKE 1 TABLET EVERY MORNING ON AN EMPTY STOMACH FOR POSTPROCEDURAL HYPOTHYROIDISM 90 Tablet 2    Cholecalciferol (VITAMIN D) 2000 UNIT Tab Take  by mouth every day.       No current facility-administered medications for this visit.        Objective:   Ht 1.651 m (5' 5\")   Wt 77.6 kg (171 lb)   BMI 28.46 kg/m²     Physical Exam:  Constitutional: Alert, no distress, well-groomed.  Skin: No rashes in visible areas.  Eye: Round. Conjunctiva clear, lids normal. No icterus.   ENMT: Lips pink without lesions, good dentition, moist mucous membranes. Phonation normal.  Neck: No masses, no thyromegaly. Moves freely without pain.  Respiratory: Unlabored respiratory effort, no cough or audible wheeze  Psych: Alert and oriented x3, normal affect and mood.     Results         Discussed with patient possible alternative diagnoses, patient is to take all medications as prescribed.      If symptoms persist FU w/PCP, if symptoms worsen go to emergency room.      If experiencing any side effects from prescribed medications report to the office immediately or go to emergency room.     Reviewed indication, dosage, " usage and potential adverse effects of prescribed medications.      Reviewed risks and benefits of treatment plan. Patient verbalizes understanding of all instruction and verbally agrees to plan.     Discussed plan with the patient, and patient agrees to the above.      I personally reviewed prior external notes and test results pertinent to today's visit.     No follow-ups on file. Annual, PRN

## 2024-07-30 ENCOUNTER — HOSPITAL ENCOUNTER (OUTPATIENT)
Facility: MEDICAL CENTER | Age: 39
End: 2024-07-30
Attending: NURSE PRACTITIONER
Payer: COMMERCIAL

## 2024-07-30 ENCOUNTER — OFFICE VISIT (OUTPATIENT)
Dept: MEDICAL GROUP | Facility: MEDICAL CENTER | Age: 39
End: 2024-07-30
Payer: COMMERCIAL

## 2024-07-30 VITALS
DIASTOLIC BLOOD PRESSURE: 78 MMHG | HEIGHT: 64 IN | OXYGEN SATURATION: 99 % | SYSTOLIC BLOOD PRESSURE: 108 MMHG | WEIGHT: 166.23 LBS | BODY MASS INDEX: 28.38 KG/M2 | HEART RATE: 78 BPM

## 2024-07-30 DIAGNOSIS — R63.8 WEIGHT DISORDER: ICD-10-CM

## 2024-07-30 DIAGNOSIS — G43.009 MIGRAINE WITHOUT AURA AND WITHOUT STATUS MIGRAINOSUS, NOT INTRACTABLE: ICD-10-CM

## 2024-07-30 DIAGNOSIS — Z79.899 CONTROLLED SUBSTANCE AGREEMENT SIGNED: ICD-10-CM

## 2024-07-30 DIAGNOSIS — E89.0 HYPOTHYROIDISM, POSTSURGICAL: ICD-10-CM

## 2024-07-30 PROCEDURE — 80307 DRUG TEST PRSMV CHEM ANLYZR: CPT

## 2024-07-30 RX ORDER — PHENTERMINE HYDROCHLORIDE 15 MG/1
15 CAPSULE ORAL EVERY MORNING
Qty: 30 CAPSULE | Refills: 0 | Status: SHIPPED | OUTPATIENT
Start: 2024-07-30 | End: 2024-08-29

## 2024-07-30 ASSESSMENT — ENCOUNTER SYMPTOMS
PALPITATIONS: 0
FEVER: 0
CHILLS: 0

## 2024-08-01 LAB
AMPHET CTO UR CFM-MCNC: NEGATIVE NG/ML
BARBITURATES CTO UR CFM-MCNC: NEGATIVE NG/ML
BENZODIAZ CTO UR CFM-MCNC: NEGATIVE NG/ML
CANNABINOIDS CTO UR CFM-MCNC: NEGATIVE NG/ML
COCAINE CTO UR CFM-MCNC: NEGATIVE NG/ML
CREAT UR-MCNC: 115.5 MG/DL (ref 20–400)
DRUG COMMENT 753798: NORMAL
METHADONE CTO UR CFM-MCNC: NEGATIVE NG/ML
OPIATES CTO UR CFM-MCNC: NEGATIVE NG/ML
PCP CTO UR CFM-MCNC: NEGATIVE NG/ML
PROPOXYPH CTO UR CFM-MCNC: NEGATIVE NG/ML

## 2024-08-30 ENCOUNTER — OFFICE VISIT (OUTPATIENT)
Dept: MEDICAL GROUP | Facility: MEDICAL CENTER | Age: 39
End: 2024-08-30
Payer: COMMERCIAL

## 2024-08-30 VITALS
RESPIRATION RATE: 16 BRPM | BODY MASS INDEX: 26.97 KG/M2 | DIASTOLIC BLOOD PRESSURE: 70 MMHG | HEIGHT: 64 IN | SYSTOLIC BLOOD PRESSURE: 100 MMHG | TEMPERATURE: 98.6 F | HEART RATE: 87 BPM | WEIGHT: 157.96 LBS

## 2024-08-30 DIAGNOSIS — G43.009 MIGRAINE WITHOUT AURA AND WITHOUT STATUS MIGRAINOSUS, NOT INTRACTABLE: ICD-10-CM

## 2024-08-30 DIAGNOSIS — R73.03 PREDIABETES: ICD-10-CM

## 2024-08-30 DIAGNOSIS — E66.9 OBESITY (BMI 30-39.9): ICD-10-CM

## 2024-08-30 DIAGNOSIS — Z71.3 NUTRITIONAL COUNSELING: ICD-10-CM

## 2024-08-30 RX ORDER — PHENTERMINE HYDROCHLORIDE 30 MG/1
30 CAPSULE ORAL EVERY MORNING
Qty: 30 CAPSULE | Refills: 1 | Status: SHIPPED | OUTPATIENT
Start: 2024-08-30 | End: 2024-10-29

## 2024-08-30 ASSESSMENT — ENCOUNTER SYMPTOMS
FEVER: 0
CHILLS: 0
PALPITATIONS: 0

## 2024-08-30 NOTE — PROGRESS NOTES
Patient agreed to use of TAMEI: yes  Chief Complaint   Patient presents with    Follow-Up       HISTORY OF PRESENT ILLNESS: Patient is a pleasant 39 y.o. female, established patient who presents today to discuss medical problems as listed below:    Assessment/Plan:    Shreya was seen today for follow-up.    Diagnoses and all orders for this visit:    Prediabetes  -     phentermine 30 MG capsule; Take 1 Capsule by mouth every morning for 60 days.    Obesity (BMI 30-39.9)  -     phentermine 30 MG capsule; Take 1 Capsule by mouth every morning for 60 days.    Migraine without aura and without status migrainosus, not intractable    Nutritional counseling              Assessment & Plan  1. Weight disorder.  She has been taking phentermine 15 mg for the past month, has lost 8 pounds, and is doing great with no side effects. The dose will be increased to 30 mg for the next 2 months. A controlled substance agreement was obtained on 07/30/2024, and PDMP was verified. She is advised to stay well hydrated while taking phentermine to avoid dry mouth and dry eyes.    2. Prediabetes.  This was noted in the labs in 2020 with an A1c of 5.8. Her fasting sugar from March 2024 was 95. She will continue with a healthy diet and exercise.    3. Migraines.  This is a chronic and stable problem. She reported a decrease in migraines and is utilizing sumatriptan as needed. No refills are needed today.    4. Nutritional counseling.  The importance of healthy nutrition was discussed, including adequate protein (100-120 g/day), fiber, and complex carbs. She is advised to emphasize protein in her diet, with each meal consisting of at least 30-40 g of protein. Options include protein powder, eggs, cottage cheese, yogurt, chicken, sausage, and jerky. Protein bars can be used as snacks to prevent hunger and muscle loss.    Follow-up  She will follow up in 2 months.    History of Present Illness  The patient presents for evaluation of multiple  "medical concerns.    She reports satisfactory progress in her weight loss journey, having lost 8 pounds over the past few weeks. Her current weight is 155 pounds, down from 163 pounds. She has been on a 15 mg dose of phentermine, which she tolerates well. Her dietary habits have improved, with a focus on protein intake and reduced overall consumption.    Supplemental Information  She denies any migraines.    Health Maintenance:  COMPLETED     Allergies, past medical history, past surgical history, family history, social history reviewed and updated.    Review of Systems   Constitutional:  Negative for chills, fever and malaise/fatigue.   Cardiovascular:  Negative for chest pain, palpitations and leg swelling.        Exam:    /70 (BP Location: Left arm, Patient Position: Sitting, BP Cuff Size: Adult)   Pulse 87   Temp 37 °C (98.6 °F) (Temporal)   Resp 16   Ht 1.613 m (5' 3.5\")   Wt 71.6 kg (157 lb 15.4 oz)   BMI 27.54 kg/m²  Body mass index is 27.54 kg/m².    Physical Exam  Constitutional:       General: She is not in acute distress.     Appearance: She is not ill-appearing.   HENT:      Head: Normocephalic and atraumatic.      Nose: Nose normal.   Eyes:      General:         Right eye: No discharge.         Left eye: No discharge.   Cardiovascular:      Rate and Rhythm: Normal rate.      Pulses: Normal pulses.      Heart sounds: Normal heart sounds. No murmur heard.  Pulmonary:      Effort: Pulmonary effort is normal. No respiratory distress.      Breath sounds: Normal breath sounds. No stridor. No wheezing or rales.   Skin:     Findings: No rash.   Neurological:      General: No focal deficit present.      Mental Status: She is alert. Mental status is at baseline.   Psychiatric:         Mood and Affect: Mood normal.         Behavior: Behavior normal.          Results  Laboratory Studies  A1c was 5.8 in 2020. Fasting sugar was 95 in March 2024.     Discussed with patient possible alternative diagnoses, " patient is to take all medications as prescribed.      If symptoms persist FU w/PCP, if symptoms worsen go to emergency room.      If experiencing any side effects from prescribed medications report to the office immediately or go to emergency room.     Reviewed indication, dosage, usage and potential adverse effects of prescribed medications.      Reviewed risks and benefits of treatment plan. Patient verbalizes understanding of all instruction and verbally agrees to plan.     Discussed plan with the patient, and patient agrees to the above.      I personally reviewed prior external notes and test results pertinent to today's visit.      No follow-ups on file. 2 mos

## 2024-09-14 ENCOUNTER — PATIENT MESSAGE (OUTPATIENT)
Dept: MEDICAL GROUP | Facility: MEDICAL CENTER | Age: 39
End: 2024-09-14
Payer: COMMERCIAL

## 2024-09-14 DIAGNOSIS — Z78.9 USES BIRTH CONTROL: ICD-10-CM

## 2024-09-16 NOTE — PATIENT COMMUNICATION
Received request via: Patient    Was the patient seen in the last year in this department? Yes    Does the patient have an active prescription (recently filled or refills available) for medication(s) requested? No    [facility]    Does the patient have FDC Plus and need 100-day supply? (This applies to ALL medications) Patient does not have SCP

## 2024-09-17 RX ORDER — NORGESTIMATE AND ETHINYL ESTRADIOL 0.25-0.035
1 KIT ORAL DAILY
Qty: 84 TABLET | Refills: 3 | Status: SHIPPED | OUTPATIENT
Start: 2024-09-17

## 2024-09-28 ENCOUNTER — HOSPITAL ENCOUNTER (OUTPATIENT)
Dept: LAB | Facility: MEDICAL CENTER | Age: 39
End: 2024-09-28
Attending: INTERNAL MEDICINE
Payer: COMMERCIAL

## 2024-09-28 DIAGNOSIS — C73 PAPILLARY CARCINOMA OF THYROID (HCC): ICD-10-CM

## 2024-09-28 DIAGNOSIS — E89.0 HYPOTHYROIDISM, POSTSURGICAL: ICD-10-CM

## 2024-09-28 DIAGNOSIS — E55.9 VITAMIN D DEFICIENCY: ICD-10-CM

## 2024-09-28 DIAGNOSIS — E83.51 HYPOCALCEMIA: ICD-10-CM

## 2024-09-28 LAB
25(OH)D3 SERPL-MCNC: 49 NG/ML (ref 30–100)
ALBUMIN SERPL BCP-MCNC: 4.2 G/DL (ref 3.2–4.9)
ALBUMIN/GLOB SERPL: 1.2 G/DL
ALP SERPL-CCNC: 66 U/L (ref 30–99)
ALT SERPL-CCNC: 39 U/L (ref 2–50)
ANION GAP SERPL CALC-SCNC: 16 MMOL/L (ref 7–16)
AST SERPL-CCNC: 31 U/L (ref 12–45)
BILIRUB SERPL-MCNC: 0.3 MG/DL (ref 0.1–1.5)
BUN SERPL-MCNC: 9 MG/DL (ref 8–22)
CALCIUM ALBUM COR SERPL-MCNC: 8.8 MG/DL (ref 8.5–10.5)
CALCIUM SERPL-MCNC: 9 MG/DL (ref 8.5–10.5)
CHLORIDE SERPL-SCNC: 98 MMOL/L (ref 96–112)
CO2 SERPL-SCNC: 22 MMOL/L (ref 20–33)
CREAT SERPL-MCNC: 0.55 MG/DL (ref 0.5–1.4)
GFR SERPLBLD CREATININE-BSD FMLA CKD-EPI: 121 ML/MIN/1.73 M 2
GLOBULIN SER CALC-MCNC: 3.4 G/DL (ref 1.9–3.5)
GLUCOSE SERPL-MCNC: 82 MG/DL (ref 65–99)
PHOSPHATE SERPL-MCNC: 4 MG/DL (ref 2.5–4.5)
POTASSIUM SERPL-SCNC: 3.7 MMOL/L (ref 3.6–5.5)
PROT SERPL-MCNC: 7.6 G/DL (ref 6–8.2)
PTH-INTACT SERPL-MCNC: 18.8 PG/ML (ref 14–72)
SODIUM SERPL-SCNC: 136 MMOL/L (ref 135–145)
T4 FREE SERPL-MCNC: 1.51 NG/DL (ref 0.93–1.7)
TSH SERPL-ACNC: 0.2 UIU/ML (ref 0.35–5.5)

## 2024-09-28 PROCEDURE — 84443 ASSAY THYROID STIM HORMONE: CPT

## 2024-09-28 PROCEDURE — 82306 VITAMIN D 25 HYDROXY: CPT

## 2024-09-28 PROCEDURE — 86800 THYROGLOBULIN ANTIBODY: CPT

## 2024-09-28 PROCEDURE — 36415 COLL VENOUS BLD VENIPUNCTURE: CPT

## 2024-09-28 PROCEDURE — 84439 ASSAY OF FREE THYROXINE: CPT

## 2024-09-28 PROCEDURE — 80053 COMPREHEN METABOLIC PANEL: CPT

## 2024-09-28 PROCEDURE — 84100 ASSAY OF PHOSPHORUS: CPT

## 2024-09-28 PROCEDURE — 83970 ASSAY OF PARATHORMONE: CPT

## 2024-10-04 ENCOUNTER — APPOINTMENT (OUTPATIENT)
Dept: MEDICAL GROUP | Facility: MEDICAL CENTER | Age: 39
End: 2024-10-04
Payer: COMMERCIAL

## 2024-10-04 ENCOUNTER — HOSPITAL ENCOUNTER (OUTPATIENT)
Facility: MEDICAL CENTER | Age: 39
End: 2024-10-04
Attending: NURSE PRACTITIONER
Payer: COMMERCIAL

## 2024-10-04 VITALS
WEIGHT: 149.91 LBS | HEART RATE: 74 BPM | SYSTOLIC BLOOD PRESSURE: 116 MMHG | TEMPERATURE: 97.8 F | OXYGEN SATURATION: 95 % | HEIGHT: 63 IN | BODY MASS INDEX: 26.56 KG/M2 | RESPIRATION RATE: 20 BRPM | DIASTOLIC BLOOD PRESSURE: 78 MMHG

## 2024-10-04 DIAGNOSIS — L71.9 ROSACEA, ACNE: ICD-10-CM

## 2024-10-04 DIAGNOSIS — L70.9 ACNE, UNSPECIFIED ACNE TYPE: ICD-10-CM

## 2024-10-04 DIAGNOSIS — N89.8 VAGINAL ITCHING: ICD-10-CM

## 2024-10-04 DIAGNOSIS — N89.8 VAGINAL DRYNESS: ICD-10-CM

## 2024-10-04 LAB
CANDIDA DNA VAG QL PROBE+SIG AMP: NEGATIVE
G VAGINALIS DNA VAG QL PROBE+SIG AMP: NEGATIVE
T VAGINALIS DNA VAG QL PROBE+SIG AMP: NEGATIVE

## 2024-10-04 PROCEDURE — 3074F SYST BP LT 130 MM HG: CPT | Performed by: NURSE PRACTITIONER

## 2024-10-04 PROCEDURE — 87510 GARDNER VAG DNA DIR PROBE: CPT

## 2024-10-04 PROCEDURE — 99214 OFFICE O/P EST MOD 30 MIN: CPT | Performed by: NURSE PRACTITIONER

## 2024-10-04 PROCEDURE — 87660 TRICHOMONAS VAGIN DIR PROBE: CPT

## 2024-10-04 PROCEDURE — 87480 CANDIDA DNA DIR PROBE: CPT

## 2024-10-04 PROCEDURE — 3078F DIAST BP <80 MM HG: CPT | Performed by: NURSE PRACTITIONER

## 2024-10-04 ASSESSMENT — ENCOUNTER SYMPTOMS
CHILLS: 0
FEVER: 0
PALPITATIONS: 0

## 2024-10-07 ENCOUNTER — TELEPHONE (OUTPATIENT)
Dept: MEDICAL GROUP | Facility: MEDICAL CENTER | Age: 39
End: 2024-10-07
Payer: COMMERCIAL

## 2024-10-08 ENCOUNTER — OFFICE VISIT (OUTPATIENT)
Dept: ENDOCRINOLOGY | Facility: MEDICAL CENTER | Age: 39
End: 2024-10-08
Attending: INTERNAL MEDICINE
Payer: COMMERCIAL

## 2024-10-08 VITALS
BODY MASS INDEX: 26.58 KG/M2 | WEIGHT: 150 LBS | DIASTOLIC BLOOD PRESSURE: 70 MMHG | HEIGHT: 63 IN | OXYGEN SATURATION: 98 % | SYSTOLIC BLOOD PRESSURE: 116 MMHG | HEART RATE: 89 BPM

## 2024-10-08 DIAGNOSIS — E55.9 VITAMIN D DEFICIENCY: ICD-10-CM

## 2024-10-08 DIAGNOSIS — C73 PAPILLARY CARCINOMA OF THYROID (HCC): ICD-10-CM

## 2024-10-08 DIAGNOSIS — E89.0 HYPOTHYROIDISM, POSTSURGICAL: ICD-10-CM

## 2024-10-08 DIAGNOSIS — L71.9 ROSACEA, ACNE: ICD-10-CM

## 2024-10-08 PROCEDURE — 99211 OFF/OP EST MAY X REQ PHY/QHP: CPT | Performed by: INTERNAL MEDICINE

## 2024-10-08 PROCEDURE — 99214 OFFICE O/P EST MOD 30 MIN: CPT | Performed by: INTERNAL MEDICINE

## 2024-10-08 PROCEDURE — 3074F SYST BP LT 130 MM HG: CPT | Performed by: INTERNAL MEDICINE

## 2024-10-08 PROCEDURE — 3078F DIAST BP <80 MM HG: CPT | Performed by: INTERNAL MEDICINE

## 2024-10-08 RX ORDER — LEVOTHYROXINE SODIUM 112 MCG
TABLET ORAL
Qty: 90 TABLET | Refills: 2 | Status: SHIPPED | OUTPATIENT
Start: 2024-10-08

## 2024-10-10 ENCOUNTER — APPOINTMENT (OUTPATIENT)
Dept: MEDICAL GROUP | Facility: MEDICAL CENTER | Age: 39
End: 2024-10-10
Payer: COMMERCIAL

## 2024-10-11 ENCOUNTER — TELEPHONE (OUTPATIENT)
Dept: MEDICAL GROUP | Facility: MEDICAL CENTER | Age: 39
End: 2024-10-11
Payer: COMMERCIAL

## 2024-10-14 ENCOUNTER — HOSPITAL ENCOUNTER (OUTPATIENT)
Dept: LAB | Facility: MEDICAL CENTER | Age: 39
End: 2024-10-14
Attending: NURSE PRACTITIONER
Payer: COMMERCIAL

## 2024-10-14 DIAGNOSIS — R63.8 WEIGHT DISORDER: ICD-10-CM

## 2024-10-14 LAB
ERYTHROCYTE [DISTWIDTH] IN BLOOD BY AUTOMATED COUNT: 43.2 FL (ref 35.9–50)
EST. AVERAGE GLUCOSE BLD GHB EST-MCNC: 123 MG/DL
HBA1C MFR BLD: 5.9 % (ref 4–5.6)
HCT VFR BLD AUTO: 40.6 % (ref 37–47)
HGB BLD-MCNC: 13.4 G/DL (ref 12–16)
MCH RBC QN AUTO: 28.9 PG (ref 27–33)
MCHC RBC AUTO-ENTMCNC: 33 G/DL (ref 32.2–35.5)
MCV RBC AUTO: 87.7 FL (ref 81.4–97.8)
PLATELET # BLD AUTO: 302 K/UL (ref 164–446)
PMV BLD AUTO: 10.9 FL (ref 9–12.9)
RBC # BLD AUTO: 4.63 M/UL (ref 4.2–5.4)
T3FREE SERPL-MCNC: 3.79 PG/ML (ref 2–4.4)
WBC # BLD AUTO: 6.4 K/UL (ref 4.8–10.8)

## 2024-10-14 PROCEDURE — 84481 FREE ASSAY (FT-3): CPT

## 2024-10-14 PROCEDURE — 36415 COLL VENOUS BLD VENIPUNCTURE: CPT

## 2024-10-14 PROCEDURE — 85027 COMPLETE CBC AUTOMATED: CPT

## 2024-10-14 PROCEDURE — 83036 HEMOGLOBIN GLYCOSYLATED A1C: CPT

## 2024-10-15 ENCOUNTER — APPOINTMENT (RX ONLY)
Dept: URBAN - METROPOLITAN AREA CLINIC 4 | Facility: CLINIC | Age: 39
Setting detail: DERMATOLOGY
End: 2024-10-15

## 2024-10-15 ENCOUNTER — TELEPHONE (OUTPATIENT)
Dept: MEDICAL GROUP | Facility: MEDICAL CENTER | Age: 39
End: 2024-10-15
Payer: COMMERCIAL

## 2024-10-15 DIAGNOSIS — L71.9 ROSACEA, ACNE: ICD-10-CM

## 2024-10-15 DIAGNOSIS — D49.2 NEOPLASM OF UNSPECIFIED BEHAVIOR OF BONE, SOFT TISSUE, AND SKIN: ICD-10-CM

## 2024-10-15 PROCEDURE — ? BIOPSY BY SHAVE METHOD

## 2024-10-15 ASSESSMENT — LOCATION DETAILED DESCRIPTION DERM: LOCATION DETAILED: LEFT CENTRAL BUCCAL CHEEK

## 2024-10-15 ASSESSMENT — LOCATION ZONE DERM: LOCATION ZONE: FACE

## 2024-10-15 ASSESSMENT — LOCATION SIMPLE DESCRIPTION DERM: LOCATION SIMPLE: LEFT CHEEK

## 2024-10-15 NOTE — PROCEDURE: MIPS QUALITY
Patent
Quality 226: Preventive Care And Screening: Tobacco Use: Screening And Cessation Intervention: Patient screened for tobacco use and is an ex/non-smoker
Detail Level: Detailed
Quality 130: Documentation Of Current Medications In The Medical Record: Current Medications Documented

## 2024-10-15 NOTE — PROCEDURE: BIOPSY BY SHAVE METHOD
Detail Level: Detailed
Depth Of Biopsy: dermis
Was A Bandage Applied: Yes
Size Of Lesion In Cm: 0
Biopsy Type: H and E
Biopsy Method: Personna blade
Anesthesia Type: 1% lidocaine with epinephrine
Anesthesia Volume In Cc: 0.5
Hemostasis: Drysol
Wound Care: Petrolatum
Dressing: bandage
Destruction After The Procedure: No
Type Of Destruction Used: Curettage
Curettage Text: The wound bed was treated with curettage after the biopsy was performed.
Cryotherapy Text: The wound bed was treated with cryotherapy after the biopsy was performed.
Electrodesiccation Text: The wound bed was treated with electrodesiccation after the biopsy was performed.
Electrodesiccation And Curettage Text: The wound bed was treated with electrodesiccation and curettage after the biopsy was performed.
Silver Nitrate Text: The wound bed was treated with silver nitrate after the biopsy was performed.
Lab: [id number]
Lab Facility: 
Path Notes (To The Dermatopathologist): Three samples present in the one specimen container.
Consent: Written consent was obtained and risks were reviewed including but not limited to scarring, infection, bleeding, scabbing, incomplete removal, nerve damage and allergy to anesthesia.
Post-Care Instructions: I reviewed with the patient in detail post-care instructions. Patient is to keep the biopsy site dry overnight, and then apply bacitracin twice daily until healed. Patient may apply hydrogen peroxide soaks to remove any crusting.
Notification Instructions: Patient will be notified of biopsy results. However, patient instructed to call the office if not contacted within 2 weeks.
Billing Type: Third-Party Bill
Information: Selecting Yes will display possible errors in your note based on the variables you have selected. This validation is only offered as a suggestion for you. PLEASE NOTE THAT THE VALIDATION TEXT WILL BE REMOVED WHEN YOU FINALIZE YOUR NOTE. IF YOU WANT TO FAX A PRELIMINARY NOTE YOU WILL NEED TO TOGGLE THIS TO 'NO' IF YOU DO NOT WANT IT IN YOUR FAXED NOTE.

## 2024-10-17 ENCOUNTER — OFFICE VISIT (OUTPATIENT)
Dept: MEDICAL GROUP | Facility: MEDICAL CENTER | Age: 39
End: 2024-10-17
Payer: COMMERCIAL

## 2024-10-17 ENCOUNTER — HOSPITAL ENCOUNTER (OUTPATIENT)
Facility: MEDICAL CENTER | Age: 39
End: 2024-10-17
Attending: NURSE PRACTITIONER
Payer: COMMERCIAL

## 2024-10-17 VITALS
TEMPERATURE: 97.6 F | BODY MASS INDEX: 26.56 KG/M2 | OXYGEN SATURATION: 96 % | SYSTOLIC BLOOD PRESSURE: 128 MMHG | HEART RATE: 112 BPM | RESPIRATION RATE: 20 BRPM | DIASTOLIC BLOOD PRESSURE: 88 MMHG | WEIGHT: 149.91 LBS | HEIGHT: 63 IN

## 2024-10-17 DIAGNOSIS — Z01.419 WELL FEMALE EXAM WITH ROUTINE GYNECOLOGICAL EXAM: ICD-10-CM

## 2024-10-17 DIAGNOSIS — Z11.3 ROUTINE SCREENING FOR STI (SEXUALLY TRANSMITTED INFECTION): ICD-10-CM

## 2024-10-17 DIAGNOSIS — Z11.51 SCREENING FOR HPV (HUMAN PAPILLOMAVIRUS): ICD-10-CM

## 2024-10-17 PROCEDURE — 88142 CYTOPATH C/V THIN LAYER: CPT

## 2024-10-17 PROCEDURE — 3074F SYST BP LT 130 MM HG: CPT | Performed by: NURSE PRACTITIONER

## 2024-10-17 PROCEDURE — 87591 N.GONORRHOEAE DNA AMP PROB: CPT

## 2024-10-17 PROCEDURE — 3079F DIAST BP 80-89 MM HG: CPT | Performed by: NURSE PRACTITIONER

## 2024-10-17 PROCEDURE — 87491 CHLMYD TRACH DNA AMP PROBE: CPT

## 2024-10-17 PROCEDURE — 99395 PREV VISIT EST AGE 18-39: CPT | Performed by: NURSE PRACTITIONER

## 2024-10-17 ASSESSMENT — FIBROSIS 4 INDEX: FIB4 SCORE: 0.61

## 2024-10-18 LAB
C TRACH DNA GENITAL QL NAA+PROBE: NEGATIVE
N GONORRHOEA DNA GENITAL QL NAA+PROBE: NEGATIVE
SPECIMEN SOURCE: NORMAL

## 2024-10-20 ENCOUNTER — RX ONLY (OUTPATIENT)
Age: 39
Setting detail: RX ONLY
End: 2024-10-20

## 2024-10-20 RX ORDER — TRETIONIN 0.5 MG/G
CREAM TOPICAL
Qty: 45 | Refills: 11 | Status: ERX | COMMUNITY
Start: 2024-10-20

## 2024-10-22 ENCOUNTER — APPOINTMENT (RX ONLY)
Dept: URBAN - METROPOLITAN AREA CLINIC 4 | Facility: CLINIC | Age: 39
Setting detail: DERMATOLOGY
End: 2024-10-22

## 2024-10-22 DIAGNOSIS — Z48.02 ENCOUNTER FOR REMOVAL OF SUTURES: ICD-10-CM

## 2024-10-22 PROCEDURE — ? SUTURE REMOVAL (GLOBAL PERIOD)

## 2024-10-22 ASSESSMENT — LOCATION DETAILED DESCRIPTION DERM: LOCATION DETAILED: RIGHT INFERIOR CENTRAL MALAR CHEEK

## 2024-10-22 ASSESSMENT — LOCATION ZONE DERM: LOCATION ZONE: FACE

## 2024-10-22 ASSESSMENT — LOCATION SIMPLE DESCRIPTION DERM: LOCATION SIMPLE: RIGHT CHEEK

## 2024-10-22 NOTE — PROCEDURE: SUTURE REMOVAL (GLOBAL PERIOD)
Detail Level: Detailed
Add 79096 Cpt? (Important Note: In 2017 The Use Of 07155 Is Being Tracked By Cms To Determine Future Global Period Reimbursement For Global Periods): no
Suture Removal Completed By (Optional): Mariela Hancock MA

## 2024-10-29 ENCOUNTER — TELEMEDICINE (OUTPATIENT)
Dept: MEDICAL GROUP | Facility: MEDICAL CENTER | Age: 39
End: 2024-10-29
Payer: COMMERCIAL

## 2024-10-29 VITALS — WEIGHT: 145 LBS | HEIGHT: 63 IN | BODY MASS INDEX: 25.69 KG/M2

## 2024-10-29 DIAGNOSIS — G44.209 MUSCLE TENSION HEADACHE: ICD-10-CM

## 2024-10-29 DIAGNOSIS — E66.9 OBESITY (BMI 30-39.9): ICD-10-CM

## 2024-10-29 DIAGNOSIS — E89.0 HYPOTHYROIDISM, POSTSURGICAL: ICD-10-CM

## 2024-10-29 DIAGNOSIS — R73.09 ELEVATED HEMOGLOBIN A1C: ICD-10-CM

## 2024-10-29 DIAGNOSIS — G43.009 MIGRAINE WITHOUT AURA AND WITHOUT STATUS MIGRAINOSUS, NOT INTRACTABLE: ICD-10-CM

## 2024-10-29 DIAGNOSIS — Z71.3 NUTRITIONAL COUNSELING: ICD-10-CM

## 2024-10-29 PROBLEM — N89.8 VAGINAL ITCHING: Status: RESOLVED | Noted: 2024-10-04 | Resolved: 2024-10-29

## 2024-10-29 PROBLEM — N89.8 VAGINAL DRYNESS: Status: RESOLVED | Noted: 2024-10-04 | Resolved: 2024-10-29

## 2024-10-29 RX ORDER — PHENTERMINE HYDROCHLORIDE 37.5 MG/1
37.5 CAPSULE ORAL EVERY MORNING
Qty: 30 CAPSULE | Refills: 0 | Status: SHIPPED | OUTPATIENT
Start: 2024-11-27 | End: 2024-12-27

## 2024-10-29 RX ORDER — PHENTERMINE HYDROCHLORIDE 37.5 MG/1
37.5 CAPSULE ORAL EVERY MORNING
Qty: 30 CAPSULE | Refills: 0 | Status: SHIPPED | OUTPATIENT
Start: 2024-10-29 | End: 2024-11-28

## 2024-10-29 ASSESSMENT — ENCOUNTER SYMPTOMS
FEVER: 0
PALPITATIONS: 0
CHILLS: 0

## 2024-10-29 ASSESSMENT — FIBROSIS 4 INDEX: FIB4 SCORE: 0.61

## 2024-11-01 LAB
HPV I/H RISK 1 DNA SPEC QL NAA+PROBE: NOT DETECTED
SPECIMEN SOURCE: NORMAL
THINPREP PAP, CYTOLOGY NL11781: NORMAL

## 2024-11-20 DIAGNOSIS — E89.0 HYPOTHYROIDISM, POSTSURGICAL: ICD-10-CM

## 2024-11-20 RX ORDER — LEVOTHYROXINE SODIUM 112 MCG
TABLET ORAL
Qty: 90 TABLET | Refills: 2 | Status: SHIPPED | OUTPATIENT
Start: 2024-11-20

## 2025-04-19 ENCOUNTER — HOSPITAL ENCOUNTER (OUTPATIENT)
Dept: LAB | Facility: MEDICAL CENTER | Age: 40
End: 2025-04-19
Attending: INTERNAL MEDICINE
Payer: OTHER GOVERNMENT

## 2025-04-19 DIAGNOSIS — E89.0 HYPOTHYROIDISM, POSTSURGICAL: ICD-10-CM

## 2025-04-19 DIAGNOSIS — E55.9 VITAMIN D DEFICIENCY: ICD-10-CM

## 2025-04-19 DIAGNOSIS — C73 PAPILLARY CARCINOMA OF THYROID (HCC): ICD-10-CM

## 2025-04-19 LAB
25(OH)D3 SERPL-MCNC: 42 NG/ML (ref 30–100)
ALBUMIN SERPL BCP-MCNC: 4.1 G/DL (ref 3.2–4.9)
ALBUMIN/GLOB SERPL: 1.2 G/DL
ALP SERPL-CCNC: 57 U/L (ref 30–99)
ALT SERPL-CCNC: 18 U/L (ref 2–50)
ANION GAP SERPL CALC-SCNC: 10 MMOL/L (ref 7–16)
AST SERPL-CCNC: 23 U/L (ref 12–45)
BILIRUB SERPL-MCNC: 0.5 MG/DL (ref 0.1–1.5)
BUN SERPL-MCNC: 21 MG/DL (ref 8–22)
CALCIUM ALBUM COR SERPL-MCNC: 8.4 MG/DL (ref 8.5–10.5)
CALCIUM SERPL-MCNC: 8.5 MG/DL (ref 8.5–10.5)
CHLORIDE SERPL-SCNC: 104 MMOL/L (ref 96–112)
CO2 SERPL-SCNC: 24 MMOL/L (ref 20–33)
CREAT SERPL-MCNC: 0.58 MG/DL (ref 0.5–1.4)
GFR SERPLBLD CREATININE-BSD FMLA CKD-EPI: 119 ML/MIN/1.73 M 2
GLOBULIN SER CALC-MCNC: 3.3 G/DL (ref 1.9–3.5)
GLUCOSE SERPL-MCNC: 91 MG/DL (ref 65–99)
POTASSIUM SERPL-SCNC: 4 MMOL/L (ref 3.6–5.5)
PROT SERPL-MCNC: 7.4 G/DL (ref 6–8.2)
SODIUM SERPL-SCNC: 138 MMOL/L (ref 135–145)
T4 FREE SERPL-MCNC: 1.55 NG/DL (ref 0.93–1.7)
TSH SERPL-ACNC: 0.83 UIU/ML (ref 0.38–5.33)

## 2025-04-19 PROCEDURE — 80053 COMPREHEN METABOLIC PANEL: CPT

## 2025-04-19 PROCEDURE — 36415 COLL VENOUS BLD VENIPUNCTURE: CPT

## 2025-04-19 PROCEDURE — 84439 ASSAY OF FREE THYROXINE: CPT

## 2025-04-19 PROCEDURE — 86800 THYROGLOBULIN ANTIBODY: CPT

## 2025-04-19 PROCEDURE — 82306 VITAMIN D 25 HYDROXY: CPT

## 2025-04-19 PROCEDURE — 84432 ASSAY OF THYROGLOBULIN: CPT

## 2025-04-19 PROCEDURE — 84443 ASSAY THYROID STIM HORMONE: CPT

## 2025-04-21 LAB
THYROGLOB AB SERPL-ACNC: <1.5 IU/ML (ref 0–4)
THYROGLOB SERPL-MCNC: <0.1 NG/ML (ref 1.3–31.8)
THYROGLOB SERPL-MCNC: ABNORMAL NG/ML (ref 1.3–31.8)

## 2025-04-22 ENCOUNTER — OFFICE VISIT (OUTPATIENT)
Dept: ENDOCRINOLOGY | Facility: MEDICAL CENTER | Age: 40
End: 2025-04-22
Attending: INTERNAL MEDICINE
Payer: OTHER GOVERNMENT

## 2025-04-22 VITALS
BODY MASS INDEX: 26.4 KG/M2 | HEIGHT: 63 IN | WEIGHT: 149 LBS | SYSTOLIC BLOOD PRESSURE: 116 MMHG | HEART RATE: 80 BPM | DIASTOLIC BLOOD PRESSURE: 82 MMHG | OXYGEN SATURATION: 96 %

## 2025-04-22 DIAGNOSIS — E55.9 VITAMIN D DEFICIENCY: ICD-10-CM

## 2025-04-22 DIAGNOSIS — C73 PAPILLARY CARCINOMA OF THYROID (HCC): ICD-10-CM

## 2025-04-22 DIAGNOSIS — E89.0 HYPOTHYROIDISM, POSTSURGICAL: ICD-10-CM

## 2025-04-22 PROCEDURE — 3079F DIAST BP 80-89 MM HG: CPT | Performed by: INTERNAL MEDICINE

## 2025-04-22 PROCEDURE — 3074F SYST BP LT 130 MM HG: CPT | Performed by: INTERNAL MEDICINE

## 2025-04-22 PROCEDURE — 99214 OFFICE O/P EST MOD 30 MIN: CPT | Performed by: INTERNAL MEDICINE

## 2025-04-22 PROCEDURE — 99211 OFF/OP EST MAY X REQ PHY/QHP: CPT | Performed by: INTERNAL MEDICINE

## 2025-04-22 RX ORDER — LEVOTHYROXINE SODIUM 112 MCG
TABLET ORAL
Qty: 90 TABLET | Refills: 2 | Status: SHIPPED | OUTPATIENT
Start: 2025-04-22

## 2025-04-22 ASSESSMENT — FIBROSIS 4 INDEX: FIB4 SCORE: 0.66

## 2025-04-22 NOTE — PROGRESS NOTES
CHIEF COMPLAINT: Followup of postsurgical hypothyroidism and papillary thyroid carcinoma.     HPI:   Shreya White is a 39 y.o. female, who had initial total thyroidectomy on 2011 at [facility] with pathology revealing papillary thyroid carcinoma 3.4 cm with positive LN 4 out 7 with the largest metastatic focus of 6mm.   She did not have problems with postoperative hypocalcemia. She was subsequently treated with radioactive iodine  156 millicuries of I-131 at [facility].  Thyrogen stimulation studies were not done.   Neck ultrasound was last done on 7/2017 showing no evidence of recurrence.      Her unstimulated thyroglobulin was less than 0.1 with negative interfering antibodies on 4/2025      Neck US was done on 8/2023 and this showed no evidence of cancer recurrence      Since last visit, she has remained on Synthroid 112mcg daily  has been her dose since 6 months.  She is feeling well.  Energy level has been excellent.  Weight is Stable.  No palpitations, no frequent diarrhea, or frequent constipation.  No heat or cold intolerance.  No anterior neck symptoms or problems.  No voice changes.       Latest Reference Range & Units 04/19/25 09:58   TSH 0.380 - 5.330 uIU/mL 0.831   Free T-4 0.93 - 1.70 ng/dL 1.55         Her vitamin D is stable   Latest Reference Range & Units 04/19/25 09:58   25-Hydroxy   Vitamin D 25 30 - 100 ng/mL 42           Patient's medications, allergies, and social histories were reviewed and updated as appropriate.      ROS:      CONS:     No fever, no chills   EYES:     No diplopia, no blurry vision   CV:           No chest pain, no palpitations   PULM:     No SOB, no cough, no hemoptysis.   GI:            No nausea, no vomiting, no diarrhea, no constipation   ENDO:     No polyuria, no polydipsia, no heat intolerance, no cold intolerance       Past Medical History:  Problem List:  2024-10: Elevated hemoglobin A1c  2024-10: Vaginal itching  2024-10: Vaginal dryness  2024-10:  "Acne  2024-10: Rosacea, acne  2024-08: Nutritional counseling  2024-05: Migraine without aura  2024-05: Muscle tightness  2024-05: Muscle tension headache  2023-09: Uses birth control  2018-01: Obesity (BMI 30-39.9)  2017-03: Nexplanon in place  2017-01: Vitamin D deficiency  2016-11: Family planning  2015-06: Hypothyroidism, postsurgical  Prediabetes  Papillary carcinoma of thyroid (HCC)      Past Surgical History:  Past Surgical History:   Procedure Laterality Date    THYROIDECTOMY TOTAL      3/24/2012        Allergies:  Patient has no known allergies.     Social History:  Social History     Tobacco Use    Smoking status: Never    Smokeless tobacco: Never   Vaping Use    Vaping status: Never Used   Substance Use Topics    Alcohol use: Yes     Alcohol/week: 0.0 oz     Comment: rare    Drug use: No        Family History:   family history is not on file.      PHYSICAL EXAM:   Vital signs: /82 (BP Location: Left arm, Patient Position: Sitting, BP Cuff Size: Adult long)   Pulse 80   Ht 1.6 m (5' 3\")   Wt 67.6 kg (149 lb)   SpO2 96%   BMI 26.39 kg/m²   GENERAL: Well-developed, well-nourished in no apparent distress.   EYE:  No ocular asymmetry, PERRLA  HENT: Pink, moist mucous membranes.    NECK: Well healed transverse scar, Thyroid is surgically absent   CARDIOVASCULAR:  No murmurs  LUNGS: Clear breath sounds  ABDOMEN: Soft, nontender   EXTREMITIES: No clubbing, cyanosis, or edema.   NEUROLOGICAL: No gross focal motor abnormalities   LYMPH: No cervical adenopathy seen  SKIN: No rashes, lesions.       Labs:  Lab Results   Component Value Date/Time    WBC 6.4 10/14/2024 05:09 PM    RBC 4.63 10/14/2024 05:09 PM    HEMOGLOBIN 13.4 10/14/2024 05:09 PM    MCV 87.7 10/14/2024 05:09 PM    MCH 28.9 10/14/2024 05:09 PM    MCHC 33.0 10/14/2024 05:09 PM    RDW 43.2 10/14/2024 05:09 PM    MPV 10.9 10/14/2024 05:09 PM       Lab Results   Component Value Date/Time    SODIUM 138 04/19/2025 09:58 AM    POTASSIUM 4.0 " 2025 09:58 AM    CHLORIDE 104 2025 09:58 AM    CO2 24 2025 09:58 AM    ANION 10.0 2025 09:58 AM    GLUCOSE 91 2025 09:58 AM    BUN 21 2025 09:58 AM    CREATININE 0.58 2025 09:58 AM    CALCIUM 8.5 2025 09:58 AM    ASTSGOT 23 2025 09:58 AM    ALTSGPT 18 2025 09:58 AM    TBILIRUBIN 0.5 2025 09:58 AM    ALBUMIN 4.1 2025 09:58 AM    TOTPROTEIN 7.4 2025 09:58 AM    GLOBULIN 3.3 2025 09:58 AM    AGRATIO 1.2 2025 09:58 AM       Lab Results   Component Value Date/Time    TSHULTRASEN 1.380 2022 1419     Lab Results   Component Value Date/Time    FREET4 1.30 2022 1419     No results found for: FREET3  No results found for: THYSTIMIG      Imagin2023 12:54 PM     HISTORY/REASON FOR EXAM:  History of papillary thyroid cancer        TECHNIQUE/EXAM DESCRIPTION:  Ultrasound of the soft tissues of the head and neck.     COMPARISON: Previous neck ultrasound in      FINDINGS:  Focused ultrasound of the area of concern bilateral neck demonstrates no mass, fluid collection or hematoma.     Survey of the bilateral lateral cervical lymph node chains reveals no suspicious-appearing lymph nodes.              IMPRESSION:     1. No abnormalities identified.     2. No suspicious lymph nodes identified in the lateral neck.           JESICA Recommendations    ASSESSMENT/PLAN:     1. Hypothyroidism, postsurgical  Stable  Overall she has an excellent response to thyroid cancer treatment  Thyroid hormone suppressive therapy is no longer indicated  Our goal is a TSH of 0.5 - 2.0  She is meeting this goal  Continue Synthroid 112 mcg Monday to Saturday and 1/2 pill on   Follow-up in 7 months with labs    2. Papillary carcinoma of thyroid (HCC)  Stable  She has an excellent response to thyroid cancer treatment  Continue monitoring quantitative thyroglobulin every 12 to 24 months  I am scheduling her for neck ultrasound this year    3.  Vitamin D deficiency  Stable  She is getting calcium vitamin D labs this week and I will update her  Repeat labs in 6 months      Return in about 7 months (around 11/22/2025).      Thank you kindly for allowing me to participate in the thyroid care plan for this patient.    Mario Abbasi MD, [place], [facility]      CC:   GENOVEVA Logan

## 2025-05-22 ENCOUNTER — HOSPITAL ENCOUNTER (OUTPATIENT)
Dept: RADIOLOGY | Facility: MEDICAL CENTER | Age: 40
End: 2025-05-22
Attending: INTERNAL MEDICINE
Payer: OTHER GOVERNMENT

## 2025-05-22 DIAGNOSIS — C73 PAPILLARY CARCINOMA OF THYROID (HCC): ICD-10-CM

## 2025-05-22 PROCEDURE — 76536 US EXAM OF HEAD AND NECK: CPT

## 2025-05-26 ENCOUNTER — RESULTS FOLLOW-UP (OUTPATIENT)
Dept: ENDOCRINOLOGY | Facility: MEDICAL CENTER | Age: 40
End: 2025-05-26